# Patient Record
Sex: FEMALE | Race: WHITE | Employment: OTHER | URBAN - METROPOLITAN AREA
[De-identification: names, ages, dates, MRNs, and addresses within clinical notes are randomized per-mention and may not be internally consistent; named-entity substitution may affect disease eponyms.]

---

## 2017-02-03 ENCOUNTER — OFFICE VISIT (OUTPATIENT)
Dept: FAMILY MEDICINE CLINIC | Age: 62
End: 2017-02-03

## 2017-02-03 ENCOUNTER — HOSPITAL ENCOUNTER (OUTPATIENT)
Dept: LAB | Age: 62
Discharge: HOME | End: 2017-02-03
Payer: MEDICARE

## 2017-02-03 VITALS
RESPIRATION RATE: 16 BRPM | BODY MASS INDEX: 34.02 KG/M2 | WEIGHT: 192 LBS | HEIGHT: 63 IN | SYSTOLIC BLOOD PRESSURE: 128 MMHG | TEMPERATURE: 98.3 F | OXYGEN SATURATION: 96 % | HEART RATE: 119 BPM | DIASTOLIC BLOOD PRESSURE: 79 MMHG

## 2017-02-03 DIAGNOSIS — N30.00 ACUTE CYSTITIS WITHOUT HEMATURIA: Primary | ICD-10-CM

## 2017-02-03 DIAGNOSIS — R30.0 DYSURIA: ICD-10-CM

## 2017-02-03 DIAGNOSIS — N30.00 ACUTE CYSTITIS WITHOUT HEMATURIA: ICD-10-CM

## 2017-02-03 LAB
BILIRUB UR QL STRIP: NEGATIVE
GLUCOSE UR-MCNC: NEGATIVE MG/DL
KETONES P FAST UR STRIP-MCNC: NEGATIVE MG/DL
PH UR STRIP: 7 [PH] (ref 4.6–8)
PROT UR QL STRIP: NEGATIVE MG/DL
SP GR UR STRIP: 1.02 (ref 1–1.03)
UA UROBILINOGEN AMB POC: NORMAL (ref 0.2–1)
URINALYSIS CLARITY POC: NORMAL
URINALYSIS COLOR POC: YELLOW
URINE BLOOD POC: NEGATIVE
URINE LEUKOCYTES POC: NORMAL
URINE NITRITES POC: POSITIVE

## 2017-02-03 PROCEDURE — 87186 SC STD MICRODIL/AGAR DIL: CPT | Performed by: FAMILY MEDICINE

## 2017-02-03 PROCEDURE — 87077 CULTURE AEROBIC IDENTIFY: CPT | Performed by: FAMILY MEDICINE

## 2017-02-03 PROCEDURE — 87086 URINE CULTURE/COLONY COUNT: CPT | Performed by: FAMILY MEDICINE

## 2017-02-03 RX ORDER — SULFAMETHOXAZOLE AND TRIMETHOPRIM 800; 160 MG/1; MG/1
1 TABLET ORAL 2 TIMES DAILY
Qty: 10 TAB | Refills: 0 | Status: SHIPPED | OUTPATIENT
Start: 2017-02-03 | End: 2017-02-08

## 2017-02-03 RX ORDER — PHENAZOPYRIDINE HYDROCHLORIDE 200 MG/1
200 TABLET, FILM COATED ORAL
Qty: 9 TAB | Refills: 0 | Status: SHIPPED | OUTPATIENT
Start: 2017-02-03 | End: 2017-09-27

## 2017-02-03 NOTE — PROGRESS NOTES
Rm 1  Pt presents to the clinic complaining of a burning sensation when urinating. Pt believes it to be a bladder infection. Flu shot requested: no    Depression Screening Completed: yes    Learning Assessment Completed: yes    Abuse Screening Completed: yes    Health Maintenance reviewed and discussed per provider: yes    Advance Directive:    1. Do you have an advance directive in place? Patient Reply: no    2. If not, would you like material regarding how to put one in place? Patient Reply: no     Coordination of Care:    1. Have you been to the ER, urgent care clinic since your last visit? Hospitalized since your last visit? no    2. Have you seen or consulted any other health care providers outside of the [address] [place] [address] since your last visit? Include any pap smears or colon screening.  no

## 2017-02-03 NOTE — PROGRESS NOTES
SUBJECTIVE: David Nieves is a 64 y.o. female who complains of urinary frequency, urgency and dysuria x 10 days, without flank pain, fever, chills, or abnormal vaginal discharge or bleeding. OBJECTIVE: Appears well, in no apparent distress. Vital signs are normal, the patient is afebrile. The abdomen is soft without tenderness, guarding, mass, rebound or organomegaly. No CVA tenderness or inguinal adenopathy noted. Urine dipstick shows positive for nitites and positive for leukocytes. Results for orders placed or performed in visit on 02/03/17   AMB POC URINALYSIS DIP STICK AUTO W/O MICRO   Result Value Ref Range    Color (UA POC) Yellow     Clarity (UA POC) Cloudy     Glucose (UA POC) Negative Negative    Bilirubin (UA POC) Negative Negative    Ketones (UA POC) Negative Negative    Specific gravity (UA POC) 1.020 1.001 - 1.035    Blood (UA POC) Negative Negative    pH (UA POC) 7.0 4.6 - 8.0    Protein (UA POC) Negative Negative mg/dL    Urobilinogen (UA POC) 0.2 mg/dL 0.2 - 1    Nitrites (UA POC) Positive Negative    Leukocyte esterase (UA POC) 3+ Negative         ASSESSMENT: UTI uncomplicated without evidence of pyelonephritis    PLAN: Treatment per orders - also push fluids. Call or return to clinic prn if these symptoms worsen or fail to improve as anticipated.

## 2017-02-03 NOTE — MR AVS SNAPSHOT
Visit Information Date & Time Provider Department Dept. Phone Encounter #  
 2/3/2017 12:45 PM Donovan Chicas, [address] [facility] [phone] [id number] Follow-up Instructions Return if symptoms worsen or fail to improve. Your Appointments 2/3/2017 12:45 PM  
Office Visit with Donovan Chicas MD  
[address] [facility]) Appt Note: Possible Bladder Infection Eileen Montes [address] [facility] [address]  
[phone]  
  
   
 [address] [id number] Upcoming Health Maintenance Date Due DTaP/Tdap/Td series (1 - Tdap) 5/3/1976 INFLUENZA AGE 9 TO ADULT 8/1/2016 BREAST CANCER SCRN MAMMOGRAM 11/20/2017 PAP AKA CERVICAL CYTOLOGY 3/2/2018 COLONOSCOPY 12/7/2025 Allergies as of 2/3/2017  Review Complete On: 2/3/2017 By: Donovan Chicas MD  
  
 Severity Noted Reaction Type Reactions Codeine  10/09/2010    Other (comments) Pass out Current Immunizations  Reviewed on 9/9/2014 Name Date Influenza Vaccine 10/10/2013 Influenza Vaccine PF 9/9/2014 10:22 AM  
  
 Not reviewed this visit You Were Diagnosed With   
  
 Codes Comments Acute cystitis without hematuria    -  Primary ICD-10-CM: N30.00 ICD-9-CM: 595.0 Dysuria     ICD-10-CM: R30.0 ICD-9-CM: 206. 1 Vitals BP Pulse Temp Resp Height(growth percentile) Weight(growth percentile) 128/79 (BP 1 Location: Right arm, BP Patient Position: Sitting) (!) 119 98.3 °F (36.8 °C) (Oral) 16 5' 3\" (1.6 m) 192 lb (87.1 kg) SpO2 BMI OB Status Smoking Status 96% 34.01 kg/m2 Hysterectomy Former Smoker Vitals History BMI and BSA Data Body Mass Index Body Surface Area 34.01 kg/m 2 1.97 m 2 Preferred Pharmacy Pharmacy Name Phone [facility] [phone] [phone] Your Updated Medication List  
  
   
 This list is accurate as of: 2/3/17 12:12 PM.  Always use your most recent med list.  
  
  
  
  
 acetaminophen 325 mg tablet Commonly known as:  TYLENOL Take 650 mg by mouth every eight (8) hours as needed for Pain. aspirin delayed-release 81 mg tablet Take 81 mg by mouth daily. calcium carbonate-vitamin D3 600 mg (1,500 mg)-800 unit Mcfarlane Take 1 Tab by mouth daily. CO Q-10 100 mg capsule Generic drug:  co-enzyme Q-10 Take 100 mg by mouth daily. gabapentin 300 mg capsule Commonly known as:  NEURONTIN Take 300 mg by mouth two (2) times a day. GEODON 80 mg capsule Generic drug:  ziprasidone Take 80 mg by mouth two (2) times daily (with meals). lovastatin 40 mg tablet Commonly known as:  MEVACOR  
TAKE 1/2 TABLET BY MOUTH ONCE DAILY AT BEDTIME  
  
 methylphenidate 20 mg tablet Commonly known as:  RITALIN Take 20 mg by mouth daily. multivitamin tablet Commonly known as:  ONE A DAY Take 1 Tab by mouth daily. phenazopyridine 200 mg tablet Commonly known as:  PYRIDIUM Take 1 Tab by mouth three (3) times daily as needed for Pain. SEROquel  mg sr tablet Generic drug:  QUEtiapine SR Take 300 mg by mouth nightly. trimethoprim-sulfamethoxazole 160-800 mg per tablet Commonly known as:  BACTRIM DS, SEPTRA DS Take 1 Tab by mouth two (2) times a day for 5 days. Prescriptions Sent to Pharmacy Refills  
 phenazopyridine (PYRIDIUM) 200 mg tablet 0 Sig: Take 1 Tab by mouth three (3) times daily as needed for Pain. Class: Normal  
 Pharmacy: [address] Ph #: [phone] Route: Oral  
 trimethoprim-sulfamethoxazole (BACTRIM DS, SEPTRA DS) 160-800 mg per tablet 0 Sig: Take 1 Tab by mouth two (2) times a day for 5 days.   
 Class: Normal  
 Pharmacy: [address] [place] [address] [facility] Ph #: [phone] Route: Oral  
  
We Performed the Following AMB POC URINALYSIS DIP STICK AUTO W/O MICRO [04694 CPT(R)] Follow-up Instructions Return if symptoms worsen or fail to improve. Patient Instructions Take the antibiotic for the full 5 days, and use the pyridium for pain as needed. Recheck if you're not better by Monday. [address] [facility] office on [address] is open on Saturdays if you ever need to be seen on a Saturday. Their hours are 8-5. Introducing [facility]! Dear Kemi Ching: Thank you for requesting a Ensequence account. Our records indicate that you have previously registered for a Ensequence account but its currently inactive. Please call our Ensequence support line at [phone]. Additional Information If you have questions, please visit the Frequently Asked Questions section of the Ensequence website at [email or web address] Remember, Ensequence is NOT to be used for urgent needs. For medical emergencies, dial 911. Now available from your iPhone and Android! Please provide this summary of care documentation to your next provider. Your primary care clinician is listed as Uday Pinto. If you have any questions after today's visit, please call [phone].

## 2017-02-03 NOTE — PATIENT INSTRUCTIONS
Take the antibiotic for the full 5 days, and use the pyridium for pain as needed. Recheck if you're not better by Monday. [address] [facility] office on [address] is open on Saturdays if you ever need to be seen on a Saturday. Their hours are 8-5.

## 2017-02-06 LAB
BACTERIA SPEC CULT: NORMAL
BACTERIA SPEC CULT: NORMAL
SERVICE CMNT-IMP: NORMAL

## 2017-04-27 ENCOUNTER — HOSPITAL ENCOUNTER (OUTPATIENT)
Dept: LAB | Age: 62
Discharge: HOME | End: 2017-04-27
Payer: MEDICARE

## 2017-04-27 PROCEDURE — 88175 CYTOPATH C/V AUTO FLUID REDO: CPT | Performed by: OBSTETRICS & GYNECOLOGY

## 2017-05-09 ENCOUNTER — HOSPITAL ENCOUNTER (OUTPATIENT)
Dept: LAB | Age: 62
Discharge: HOME | End: 2017-05-09
Payer: MEDICARE

## 2017-05-09 ENCOUNTER — OFFICE VISIT (OUTPATIENT)
Dept: FAMILY MEDICINE CLINIC | Age: 62
End: 2017-05-09

## 2017-05-09 VITALS
WEIGHT: 208 LBS | HEIGHT: 63 IN | OXYGEN SATURATION: 96 % | RESPIRATION RATE: 16 BRPM | BODY MASS INDEX: 36.86 KG/M2 | DIASTOLIC BLOOD PRESSURE: 88 MMHG | HEART RATE: 83 BPM | TEMPERATURE: 99.1 F | SYSTOLIC BLOOD PRESSURE: 118 MMHG

## 2017-05-09 DIAGNOSIS — E78.5 DYSLIPIDEMIA: ICD-10-CM

## 2017-05-09 DIAGNOSIS — Z00.00 ROUTINE GENERAL MEDICAL EXAMINATION AT A HEALTH CARE FACILITY: Primary | ICD-10-CM

## 2017-05-09 DIAGNOSIS — Z13.39 SCREENING FOR ALCOHOLISM: ICD-10-CM

## 2017-05-09 DIAGNOSIS — Z79.899 HIGH RISK MEDICATION USE: ICD-10-CM

## 2017-05-09 LAB
ALBUMIN SERPL BCP-MCNC: 4.2 G/DL (ref 3.4–5)
ALBUMIN/GLOB SERPL: 1.2 {RATIO} (ref 0.8–1.7)
ALP SERPL-CCNC: 122 U/L (ref 45–117)
ALT SERPL-CCNC: 31 U/L (ref 13–56)
ANION GAP BLD CALC-SCNC: 8 MMOL/L (ref 3–18)
AST SERPL W P-5'-P-CCNC: 22 U/L (ref 15–37)
BILIRUB SERPL-MCNC: 0.4 MG/DL (ref 0.2–1)
BUN SERPL-MCNC: 15 MG/DL (ref 7–18)
BUN/CREAT SERPL: 15 (ref 12–20)
CALCIUM SERPL-MCNC: 10.7 MG/DL (ref 8.5–10.1)
CHLORIDE SERPL-SCNC: 106 MMOL/L (ref 100–108)
CHOLEST SERPL-MCNC: 170 MG/DL
CO2 SERPL-SCNC: 32 MMOL/L (ref 21–32)
CREAT SERPL-MCNC: 1.03 MG/DL (ref 0.6–1.3)
GLOBULIN SER CALC-MCNC: 3.5 G/DL (ref 2–4)
GLUCOSE SERPL-MCNC: 99 MG/DL (ref 74–99)
HDLC SERPL-MCNC: 79 MG/DL (ref 40–60)
HDLC SERPL: 2.2 {RATIO} (ref 0–5)
LDLC SERPL CALC-MCNC: 68.4 MG/DL (ref 0–100)
LIPID PROFILE,FLP: ABNORMAL
POTASSIUM SERPL-SCNC: 5.2 MMOL/L (ref 3.5–5.5)
PROT SERPL-MCNC: 7.7 G/DL (ref 6.4–8.2)
SODIUM SERPL-SCNC: 146 MMOL/L (ref 136–145)
TRIGL SERPL-MCNC: 113 MG/DL (ref ?–150)
VLDLC SERPL CALC-MCNC: 22.6 MG/DL

## 2017-05-09 PROCEDURE — 80061 LIPID PANEL: CPT | Performed by: PHYSICIAN ASSISTANT

## 2017-05-09 PROCEDURE — 80053 COMPREHEN METABOLIC PANEL: CPT | Performed by: PHYSICIAN ASSISTANT

## 2017-05-09 NOTE — PROGRESS NOTES
This is a Subsequent Medicare Annual Wellness Visit providing Personalized Prevention Plan Services (PPPS) (Performed 12 months after initial AWV and PPPS )    I have reviewed the patient's medical history in detail and updated the computerized patient record. History     Past Medical History:   Diagnosis Date    Attention deficit hyperactivity disorder     Bipolar disorder     Depression     nos    Dyslipidemia     Fetal alcohol syndrome       Past Surgical History:   Procedure Laterality Date    HX COLONOSCOPY  12/7/2015    Repeat colonoscopy in 10 yrs per Dr. Lauro Fontaine      2001  varicose vein     Current Outpatient Prescriptions   Medication Sig Dispense Refill    lovastatin (MEVACOR) 40 mg tablet TAKE 1/2 TABLET BY MOUTH ONCE DAILY AT BEDTIME 30 Tab 4    calcium carbonate-vitamin D3 600 mg (1,500 mg)-800 unit chew Take 1 Tab by mouth daily.  gabapentin (NEURONTIN) 300 mg capsule Take 300 mg by mouth two (2) times a day.  QUEtiapine SR (SEROQUEL XR) 300 mg sr tablet Take 200 mg by mouth nightly.  methylphenidate (RITALIN) 20 mg tablet Take 20 mg by mouth daily.  aspirin delayed-release 81 mg tablet Take 81 mg by mouth daily.  multivitamin (ONE A DAY) tablet Take 1 Tab by mouth daily.  ziprasidone (GEODON) 80 mg capsule Take 80 mg by mouth two (2) times daily (with meals).  phenazopyridine (PYRIDIUM) 200 mg tablet Take 1 Tab by mouth three (3) times daily as needed for Pain. 9 Tab 0    co-enzyme Q-10 (CO Q-10) 100 mg capsule Take 100 mg by mouth daily.  acetaminophen (TYLENOL) 325 mg tablet Take 650 mg by mouth every eight (8) hours as needed for Pain.        Allergies   Allergen Reactions    Codeine Other (comments)     Pass out     Family History   Problem Relation Age of Onset    Heart Attack Father     Other Father      Polio left hand, childhood onset    Alcohol abuse Mother    [facility] Bipolar Disorder Mother     Bipolar Disorder Brother     Attention Deficit Disorder Brother     Congenital heart defect Paternal Aunt     Alcohol abuse Brother     Bipolar Disorder Brother     Suicide Brother     Congenital heart defect Son     Attention Deficit Hyperactivity Disorder Son      Social History   Substance Use Topics    Smoking status: Former Smoker     Types: Cigarettes     Start date: 3/17/1968     Quit date: 3/17/1999    Smokeless tobacco: Never Used    Alcohol use 0.5 oz/week     1 Shots of liquor per week      Comment: 1-2 shots of liqueur/month     Patient Active Problem List   Diagnosis Code    Dyslipidemia E78.5       Depression Risk Factor Screening:     PHQ over the last two weeks 5/9/2017   PHQ Not Done -   Little interest or pleasure in doing things Not at all   Feeling down, depressed or hopeless Not at all   Total Score PHQ 2 0     Alcohol Risk Factor Screening: On any occasion during the past 3 months, have you had more than 3 drinks containing alcohol? No    Do you average more than 7 drinks per week? No      Functional Ability and Level of Safety:     Hearing Loss   none    Activities of Daily Living   Self-care. Requires assistance with: no ADLs    Fall Risk     Fall Risk Assessment, last 12 mths 4/7/2014   Able to walk? Yes   Fall in past 12 months? No     Abuse Screen   Patient is not abused    Review of Systems   Review of Systems   Constitutional: Negative for chills, fever and malaise/fatigue. HENT: Negative for congestion and sore throat. Eyes: Negative for blurred vision and double vision. Respiratory: Negative for cough and shortness of breath. Cardiovascular: Negative for chest pain and palpitations. Gastrointestinal: Negative for blood in stool, nausea and vomiting. Genitourinary: Negative for hematuria. Musculoskeletal: Negative for joint pain and myalgias. Skin: Negative for rash.    Neurological: Negative for dizziness, tingling, weakness and headaches. Psychiatric/Behavioral: Positive for depression (under treatment). The patient is not nervous/anxious and does not have insomnia. Physical Examination     Evaluation of Cognitive Function:  Mood/affect:  neutral, happy  Appearance: age appropriate  Family member/caregiver input: none    See E&M note. Patient Care Team:  Lauri Lemons MD as PCP - General (Family Practice)  Arlyn Etienne MD as Physician (Psychiatry)  Vesna Pickard MD as Physician (Obstetrics & Gynecology)  Ramonita Nguyen MD as Physician (Cardiology)  Maria Luisa Hopkins NP (Nurse Practitioner)  Mey Salazar MD (Colon and Rectal Surgery)    Advice/Referrals/Counseling   Education and counseling provided:  Are appropriate based on today's review and evaluation      Assessment/Plan       ICD-10-CM ICD-9-CM    1. Routine general medical examination at a health care facility Z00.00 V70.0    2. Screening for alcoholism Z13.89 V79.1    3. Dyslipidemia E78.5 272.4 LIPID PANEL   4. High risk medication use N07.343 Y63.36 METABOLIC PANEL, COMPREHENSIVE   . Pt up to date on health maintenance, except possibly TDAP, which is not covered by medicare except in the event of skin-penetrating injury. Otherwise, pt is doing well on current regimen. Will follow labs.      Tu Ritter PA-C

## 2017-05-09 NOTE — MR AVS SNAPSHOT
Visit Information Date & Time Provider Department Dept. Phone Encounter #  
 5/9/2017 11:30 AM Steven Baldwin PA-C [facility] [phone] [id number] Upcoming Health Maintenance Date Due DTaP/Tdap/Td series (1 - Tdap) 5/3/1976 INFLUENZA AGE 9 TO ADULT 8/1/2017 BREAST CANCER SCRN MAMMOGRAM 11/20/2017 PAP AKA CERVICAL CYTOLOGY 4/27/2020 COLONOSCOPY 12/7/2025 Allergies as of 5/9/2017  Review Complete On: 5/9/2017 By: Steven Baldwin PA-C Severity Noted Reaction Type Reactions Codeine  10/09/2010    Other (comments) Pass out Current Immunizations  Reviewed on 9/9/2014 Name Date Influenza Vaccine 10/10/2013 Influenza Vaccine PF 9/9/2014 10:22 AM  
  
 Not reviewed this visit You Were Diagnosed With   
  
 Codes Comments Dyslipidemia    -  Primary ICD-10-CM: E78.5 ICD-9-CM: 272.4 Routine general medical examination at a health care facility     ICD-10-CM: Z00.00 ICD-9-CM: V70.0 Screening for alcoholism     ICD-10-CM: Z13.89 ICD-9-CM: V79.1 High risk medication use     ICD-10-CM: Z79.899 ICD-9-CM: V58.69 Vitals BP Pulse Temp Resp Height(growth percentile) Weight(growth percentile) 118/88 (BP 1 Location: Right arm, BP Patient Position: Sitting) 83 99.1 °F (37.3 °C) (Oral) 16 5' 3\" (1.6 m) 208 lb (94.3 kg) SpO2 BMI OB Status Smoking Status 96% 36.85 kg/m2 Hysterectomy Former Smoker BMI and BSA Data Body Mass Index Body Surface Area  
 36.85 kg/m 2 2.05 m 2 Preferred Pharmacy Pharmacy Name Phone [facility] [address] [place] [address]. [address] [phone] Your Updated Medication List  
  
   
This list is accurate as of: 5/9/17 11:56 AM.  Always use your most recent med list.  
  
  
  
  
 acetaminophen 325 mg tablet Commonly known as:  TYLENOL  
 Take 650 mg by mouth every eight (8) hours as needed for Pain. aspirin delayed-release 81 mg tablet Take 81 mg by mouth daily. calcium carbonate-vitamin D3 600 mg (1,500 mg)-800 unit Mcfarlane Take 1 Tab by mouth daily. CO Q-10 100 mg capsule Generic drug:  co-enzyme Q-10 Take 100 mg by mouth daily. gabapentin 300 mg capsule Commonly known as:  NEURONTIN Take 300 mg by mouth two (2) times a day. GEODON 80 mg capsule Generic drug:  ziprasidone Take 80 mg by mouth two (2) times daily (with meals). lovastatin 40 mg tablet Commonly known as:  MEVACOR  
TAKE 1/2 TABLET BY MOUTH ONCE DAILY AT BEDTIME  
  
 methylphenidate 20 mg tablet Commonly known as:  RITALIN Take 20 mg by mouth daily. multivitamin tablet Commonly known as:  ONE A DAY Take 1 Tab by mouth daily. phenazopyridine 200 mg tablet Commonly known as:  PYRIDIUM Take 1 Tab by mouth three (3) times daily as needed for Pain. SEROquel  mg sr tablet Generic drug:  QUEtiapine SR Take 200 mg by mouth nightly. Patient Instructions Studies are showing some important health benefits of vitamin K2 (not to be confused with vitamin K1). It has been shown to improve heart health and bone health, which can be important for conditions like osteoporosis and atherosclerosis. Food sources include natto (fermented soy), liver, egg yolks, meat, and high-fat dairy. There are also supplements available online and in health food stores (as MK-7 and MK-4). I recommend about 200 mcg of MK-7 daily. Here are some good online articles for more information: 
 
http://authoritynutrition. com/vitamin-k2/ 
https://www.Trion Worlds.com/. shtml 
articles. mercola.EasyPost/sites/articles/archive/2015/06/28/vitamin-k2-health-benefits. aspx I also recommend resuming the Co-Q10 supplement.   
 
A healthy gut contains many species of bacteria and yeast that can help with the breakdown of starches, and improve the function of the immune system (probiotics). Good gut margarita also help with the absorption of nutrients such as vitamins A,D,E, and K, as well as calcium, iron, and chromium. Probiotic foods or supplements help to add microorganisms to the stomach and intestines. These foods include fermented foods such as yogurt, sauerkraut, kefir, kimchi, natto, and miso. Prebiotics are foods that help feed the existing microorganisms in the gut. These include bananas, artichokes, leeks, garlic, and onions. http://authorityNormOxys. com/probiotics-101/ 
https://Bellmetric.Red Hawk Interactive/19-best-prebiotic-foods/ 
https://Bellmetric. Red Hawk Interactive/8-health-benefits-of-probiotics/ 
https://Bellmetric.Red Hawk Interactive/best-probiotic-supplement/ 
 
 
 
  
Introducing MYCHART! Dear Kavin Martinez: Thank you for requesting a InfoRemate account. Our records indicate that you have previously registered for a InfoRemate account but its currently inactive. Please call our InfoRemate support line at [phone]. Additional Information If you have questions, please visit the Frequently Asked Questions section of the InfoRemate website at [email or web address] Remember, 8x8 Inct is NOT to be used for urgent needs. For medical emergencies, dial 911. Now available from your iPhone and Android! Please provide this summary of care documentation to your next provider. Your primary care clinician is listed as Uday Pinto. If you have any questions after today's visit, please call [phone].

## 2017-05-09 NOTE — PROGRESS NOTES
HISTORY OF PRESENT ILLNESS  Namita Ojeda is a 58 y.o. female. HPI Comments: Pt presents for her annual wellness visit and for follow-up of her dyslipidemia. States she stopped taking Coenzyme Q10 for no reason other than she didn't renew it. She is seeing a psychiatrist who she feels is managing her bipolar and ADHD well, and is otherwise feeling well today. Complete Physical   Pertinent negatives include no chest pain, no headaches and no shortness of breath. Review of Systems   Constitutional: Negative for chills, fever and malaise/fatigue. HENT: Negative for congestion and sore throat. Eyes: Negative for blurred vision and double vision. Respiratory: Negative for cough and shortness of breath. Cardiovascular: Negative for chest pain and palpitations. Gastrointestinal: Negative for blood in stool, nausea and vomiting. Genitourinary: Negative for hematuria. Musculoskeletal: Negative for joint pain and myalgias. Skin: Negative for rash. Neurological: Negative for dizziness, tingling, weakness and headaches. Psychiatric/Behavioral: Positive for depression (under treatment). The patient is not nervous/anxious and does not have insomnia. Visit Vitals    /88 (BP 1 Location: Right arm, BP Patient Position: Sitting)    Pulse 83    Temp 99.1 °F (37.3 °C) (Oral)    Resp 16    Ht 5' 3\" (1.6 m)    Wt 208 lb (94.3 kg)    SpO2 96%    BMI 36.85 kg/m2       Physical Exam   Constitutional: She is oriented to person, place, and time. Vital signs are normal. She appears well-developed and well-nourished. She is cooperative. She does not appear ill. No distress. HENT:   Head: Normocephalic and atraumatic. Right Ear: External ear normal.   Left Ear: External ear normal.   Nose: Nose normal. No nasal deformity. Mouth/Throat: Oropharynx is clear and moist and mucous membranes are normal.   Eyes: Conjunctivae are normal.   Neck: Neck supple.    Cardiovascular: Normal rate, regular rhythm and normal heart sounds. Exam reveals no gallop and no friction rub. No murmur heard. Pulses:       Radial pulses are 2+ on the right side, and 2+ on the left side. Pulmonary/Chest: Effort normal and breath sounds normal. She has no decreased breath sounds. She has no wheezes. She has no rhonchi. She has no rales. Lymphadenopathy:     She has no cervical adenopathy. Neurological: She is alert and oriented to person, place, and time. Skin: Skin is warm and dry. Psychiatric: She has a normal mood and affect. Her speech is normal and behavior is normal. Thought content normal.   Nursing note and vitals reviewed. ASSESSMENT and PLAN    ICD-10-CM ICD-9-CM    1. Routine general medical examination at a health care facility Z00.00 V70.0    2. Screening for alcoholism Z13.89 V79.1    3. Dyslipidemia E78.5 272.4 LIPID PANEL   4. High risk medication use E06.371 E99.02 METABOLIC PANEL, COMPREHENSIVE     1. Medicare annual wellness visit (). HM is up to date (except Tdap). 2. No risk noted for alcoholism (states she drinks rarely). 3. Recommended pt resume her intake of Co-Q10 d/t depletion effects of her statin medication. She indicated that she would start taking it again. 4. Will follow labs and advise. Also discussed adding a vitamin K-2 supplement to her regimen d/t history of osteopenia (?), and currently supplementing with vitamin D and calcium. Recommended 200 mcg daily & provided list of vitamin k2 foods. Pt verbalized understanding and agreement with the plan of care.     Ana Curran PA-C

## 2017-05-09 NOTE — PATIENT INSTRUCTIONS
Studies are showing some important health benefits of vitamin K2 (not to be confused with vitamin K1). It has been shown to improve heart health and bone health, which can be important for conditions like osteoporosis and atherosclerosis. Food sources include natto (fermented soy), liver, egg yolks, meat, and high-fat dairy. There are also supplements available online and in health food stores (as MK-7 and MK-4). I recommend about 200 mcg of MK-7 daily. Here are some good online articles for more information:    http://Cozmik Body. Privlo/vitamin-k2/  https://www.Good Eggs/. shtml  articles. mercola.Privlo/sites/articles/archive/2015/06/28/vitamin-k2-health-benefits. aspx    I also recommend resuming the Co-Q10 supplement. A healthy gut contains many species of bacteria and yeast that can help with the breakdown of starches, and improve the function of the immune system (probiotics). Good gut margarita also help with the absorption of nutrients such as vitamins A,D,E, and K, as well as calcium, iron, and chromium. Probiotic foods or supplements help to add microorganisms to the stomach and intestines. These foods include fermented foods such as yogurt, sauerkraut, kefir, kimchi, natto, and miso. Prebiotics are foods that help feed the existing microorganisms in the gut. These include bananas, artichokes, leeks, garlic, and onions. http://authoritynutrition. com/probiotics-101/  https://authorityBetaUsersNow.com.com/19-best-prebiotic-foods/  https://authorityBetaUsersNow.com. com/8-health-benefits-of-probiotics/  https://authorityBetaUsersNow.com.com/best-probiotic-supplement/

## 2017-05-09 NOTE — PROGRESS NOTES
Patient presents to the clinic for a complete physical.      Depression Screening Completed: yes    Learning Assessment Completed: yes    Abuse Screening Completed: yes    Health Maintenance reviewed and discussed per provider: yes     Coordination of Care:    1. Have you been to the ER, urgent care clinic since your last visit? Hospitalized since your last visit? no    2. Have you seen or consulted any other health care providers outside of the [address] [place] [address] since your last visit? Include any pap smears or colon screening.  no

## 2017-05-10 ENCOUNTER — TELEPHONE (OUTPATIENT)
Dept: FAMILY MEDICINE CLINIC | Age: 62
End: 2017-05-10

## 2017-05-10 NOTE — PROGRESS NOTES
Lipids look good. Sodium and calcium are slightly elevated. Calcium historically seems to stay in the top of the range and showed mild elevation about 2 years ago. Plan to recheck in a couple of weeks. Alk phos also slightly elevated (not seen previously).

## 2017-05-10 NOTE — TELEPHONE ENCOUNTER
Please let pt know that her calcium came back slightly elevated and ask her to return well-hydrated in a couple of weeks so we can recheck it. Her cholesterol looks good!

## 2017-05-12 NOTE — TELEPHONE ENCOUNTER
Called patient to inform her that her lab work came back and her cholesterol was good, however her calcium was low and that she needs to come back in a couple of weeks to get it rechecked. Patient didn't answer the phone and no option to leave message was available. Will try again later.

## 2017-05-12 NOTE — TELEPHONE ENCOUNTER
Called patient at [phone] to inform her that her lab work came back. Her cholesterol was good, however her calcium was low and she needs to come back in 1-2 months for a recheck. Phone is no longer in service. I will be mailing a letter to her to have her give a call to the clinic to receive her results and update the phone number.

## 2017-05-15 ENCOUNTER — TELEPHONE (OUTPATIENT)
Dept: FAMILY MEDICINE CLINIC | Age: 62
End: 2017-05-15

## 2017-05-15 NOTE — TELEPHONE ENCOUNTER
Called patient back. Patient was informed our PA Marcia Nieves reviewed her lab results and it indicated her calcium level is slightly elevated. Patient was advised JEANNIE Walker recommends rechecking in a few weeks well-hydrated. Patient was also informed her cholesterol looks good. Patient verbalized understanding and had no further questions.

## 2017-05-15 NOTE — TELEPHONE ENCOUNTER
Letter sent to patient to inform her that we are unable to reach her by phone and to call us at [phone].

## 2017-08-09 ENCOUNTER — HOSPITAL ENCOUNTER (OUTPATIENT)
Dept: GENERAL RADIOLOGY | Age: 62
Discharge: HOME | End: 2017-08-09
Attending: OBSTETRICS & GYNECOLOGY

## 2017-08-09 DIAGNOSIS — M81.0 OSTEOPOROSIS: ICD-10-CM

## 2017-08-09 DIAGNOSIS — Z78.0 POST-MENOPAUSAL: ICD-10-CM

## 2017-08-10 ENCOUNTER — HOSPITAL ENCOUNTER (OUTPATIENT)
Dept: GENERAL RADIOLOGY | Age: 62
Discharge: HOME | End: 2017-08-10
Attending: OBSTETRICS & GYNECOLOGY
Payer: MEDICARE

## 2017-08-10 ENCOUNTER — HOSPITAL ENCOUNTER (OUTPATIENT)
Dept: MAMMOGRAPHY | Age: 62
Discharge: HOME | End: 2017-08-10
Attending: OBSTETRICS & GYNECOLOGY
Payer: MEDICARE

## 2017-08-10 DIAGNOSIS — Z12.39 BREAST CANCER SCREENING: ICD-10-CM

## 2017-08-10 PROCEDURE — 77063 BREAST TOMOSYNTHESIS BI: CPT

## 2017-08-10 PROCEDURE — 77080 DXA BONE DENSITY AXIAL: CPT

## 2017-09-27 ENCOUNTER — OFFICE VISIT (OUTPATIENT)
Dept: FAMILY MEDICINE CLINIC | Age: 62
End: 2017-09-27

## 2017-09-27 ENCOUNTER — HOSPITAL ENCOUNTER (OUTPATIENT)
Dept: LAB | Age: 62
Discharge: HOME | End: 2017-09-27
Payer: MEDICARE

## 2017-09-27 VITALS
OXYGEN SATURATION: 95 % | HEIGHT: 63 IN | RESPIRATION RATE: 16 BRPM | BODY MASS INDEX: 36.32 KG/M2 | DIASTOLIC BLOOD PRESSURE: 89 MMHG | SYSTOLIC BLOOD PRESSURE: 126 MMHG | WEIGHT: 205 LBS | HEART RATE: 83 BPM | TEMPERATURE: 98.6 F

## 2017-09-27 DIAGNOSIS — N30.90 CYSTITIS: ICD-10-CM

## 2017-09-27 DIAGNOSIS — N30.90 CYSTITIS: Primary | ICD-10-CM

## 2017-09-27 DIAGNOSIS — R82.90 ABNORMAL URINE ODOR: ICD-10-CM

## 2017-09-27 DIAGNOSIS — E78.5 DYSLIPIDEMIA: ICD-10-CM

## 2017-09-27 DIAGNOSIS — Z23 ENCOUNTER FOR IMMUNIZATION: ICD-10-CM

## 2017-09-27 DIAGNOSIS — Z13.5 SCREENING FOR GLAUCOMA: ICD-10-CM

## 2017-09-27 LAB
BILIRUB UR QL STRIP: NEGATIVE
GLUCOSE UR-MCNC: NEGATIVE MG/DL
KETONES P FAST UR STRIP-MCNC: NEGATIVE MG/DL
PH UR STRIP: 5.5 [PH] (ref 4.6–8)
PROT UR QL STRIP: NEGATIVE MG/DL
SP GR UR STRIP: 1.02 (ref 1–1.03)
UA UROBILINOGEN AMB POC: NORMAL (ref 0.2–1)
URINALYSIS CLARITY POC: NORMAL
URINALYSIS COLOR POC: YELLOW
URINE BLOOD POC: NEGATIVE
URINE LEUKOCYTES POC: NORMAL
URINE NITRITES POC: POSITIVE

## 2017-09-27 PROCEDURE — 87186 SC STD MICRODIL/AGAR DIL: CPT | Performed by: FAMILY MEDICINE

## 2017-09-27 PROCEDURE — 87077 CULTURE AEROBIC IDENTIFY: CPT | Performed by: FAMILY MEDICINE

## 2017-09-27 PROCEDURE — 87086 URINE CULTURE/COLONY COUNT: CPT | Performed by: FAMILY MEDICINE

## 2017-09-27 RX ORDER — SULFAMETHOXAZOLE AND TRIMETHOPRIM 800; 160 MG/1; MG/1
1 TABLET ORAL 2 TIMES DAILY
Qty: 10 TAB | Refills: 0 | Status: SHIPPED | OUTPATIENT
Start: 2017-09-27 | End: 2017-10-02

## 2017-09-27 NOTE — PROGRESS NOTES
HISTORY OF PRESENT ILLNESS  Namita Hernandez is a 58 y.o. female. HPI Comments: Namita is here today c/o urinary frequency and odor. She states she was drinking a lot of tea and quit but still has the symptoms. She denies having frequent UTIs and denies any other symptoms at this time. She would like a referral to an eye doctor as it has been 3 yrs and she does not have one. Cholesterol Problem   Pertinent negatives include no chest pain, no abdominal pain, no headaches and no shortness of breath. Review of Systems   Constitutional: Negative for chills and fever. Eyes: Negative for blurred vision and double vision. Respiratory: Positive for cough. Negative for sputum production and shortness of breath. Cardiovascular: Negative for chest pain and palpitations. Gastrointestinal: Negative for abdominal pain, nausea and vomiting. Genitourinary: Positive for frequency. Negative for dysuria, flank pain, hematuria and urgency. Foul odor   Neurological: Negative for headaches. Physical Exam   Constitutional: Vital signs are normal. She appears well-developed and well-nourished. HENT:   Right Ear: Tympanic membrane and ear canal normal.   Left Ear: Tympanic membrane and ear canal normal.   Nose: Nose normal.   Mouth/Throat: Uvula is midline, oropharynx is clear and moist and mucous membranes are normal.   Eyes: Pupils are equal, round, and reactive to light. Neck: No thyromegaly present. Cardiovascular: Normal rate and regular rhythm. Pulmonary/Chest: Effort normal and breath sounds normal. No respiratory distress. She has no wheezes. She has no rales. Abdominal: She exhibits no distension. There is no tenderness. There is no rebound, no guarding and no CVA tenderness. Lymphadenopathy:     She has no cervical adenopathy. Skin: No rash noted. Nursing note and vitals reviewed.     Results for orders placed or performed in visit on 09/27/17   [facility] POC URINALYSIS DIP STICK AUTO W/O MICRO   Result Value Ref Range    Color (UA POC) Yellow     Clarity (UA POC) Cloudy     Glucose (UA POC) Negative Negative    Bilirubin (UA POC) Negative Negative    Ketones (UA POC) Negative Negative    Specific gravity (UA POC) 1.020 1.001 - 1.035    Blood (UA POC) Negative Negative    pH (UA POC) 5.5 4.6 - 8.0    Protein (UA POC) Negative Negative mg/dL    Urobilinogen (UA POC) 0.2 mg/dL 0.2 - 1    Nitrites (UA POC) Positive Negative    Leukocyte esterase (UA POC) 2+ Negative       ASSESSMENT and PLAN    ICD-10-CM ICD-9-CM    1. Cystitis N30.90 595.9 trimethoprim-sulfamethoxazole (BACTRIM DS, SEPTRA DS) 160-800 mg per tablet      CULTURE, URINE   2. Abnormal urine odor R82.90 791.9 AMB POC URINALYSIS DIP STICK AUTO W/O MICRO   3. Dyslipidemia E78.5 272.4    4. Encounter for immunization Z23 V03.89 INFLUENZA VIRUS VAC QUAD,SPLIT,PRESV FREE SYRINGE IM      ADMIN INFLUENZA VIRUS VAC      REFERRAL TO OPTOMETRY      CANCELED: SD IMMUNIZ ADMIN,1 SINGLE/COMB VAC/TOXOID   5.  Screening for glaucoma Z13.5 V80.1 REFERRAL TO OPTOMETRY       AVS instructions reviewed with patient, pt verbalized understanding

## 2017-09-27 NOTE — PROGRESS NOTES
Pt received influenza vaccine 0.5ml in right deltoid. Tolerated well. No signs or symptoms of distress noted. Most current VIS given and consent signed.

## 2017-09-27 NOTE — PROGRESS NOTES
Patient presents to the clinic for a follow up on her cholesterol. Patient would also like to discuss urinary frequency that began a few months ago. Vinay Rowell

## 2017-09-27 NOTE — PATIENT INSTRUCTIONS
Take the antibiotic for 5 days. Recheck if it doesn't get better in 2-3 days. Recheck December, we can get labs then. I've referred you to an eye doctor for a checkup. Recheck as needed.

## 2017-09-27 NOTE — MR AVS SNAPSHOT
Visit Information Date & Time Provider Department Dept. Phone Encounter #  
 9/27/2017  8:45 AM Mago Clifford MD [facility] [phone] [id number] Upcoming Health Maintenance Date Due DTaP/Tdap/Td series (1 - Tdap) 5/3/1976 BREAST CANCER SCRN MAMMOGRAM 8/10/2019 PAP AKA CERVICAL CYTOLOGY 4/27/2020 COLONOSCOPY 12/7/2025 Allergies as of 9/27/2017  Review Complete On: 9/27/2017 By: Courtney Srinivasan LPN Severity Noted Reaction Type Reactions Codeine  10/09/2010    Other (comments) Pass out Current Immunizations  Reviewed on 9/9/2014 Name Date Influenza Vaccine 10/10/2013 Influenza Vaccine (Quad) PF 9/27/2017 Influenza Vaccine PF 9/9/2014 10:22 AM  
  
 Not reviewed this visit You Were Diagnosed With   
  
 Codes Comments Cystitis    -  Primary ICD-10-CM: N30.90 ICD-9-CM: 595.9 Abnormal urine odor     ICD-10-CM: R82.90 ICD-9-CM: 791.9 Dyslipidemia     ICD-10-CM: E78.5 ICD-9-CM: 272.4 Encounter for immunization     ICD-10-CM: C62 ICD-9-CM: V03.89 Screening for glaucoma     ICD-10-CM: Z13.5 ICD-9-CM: V80.1 Vitals BP Pulse Temp Resp Height(growth percentile) Weight(growth percentile) 126/89 (BP 1 Location: Left arm, BP Patient Position: Sitting) 83 98.6 °F (37 °C) (Oral) 16 5' 3\" (1.6 m) 205 lb (93 kg) SpO2 BMI OB Status Smoking Status 95% 36.31 kg/m2 Hysterectomy Former Smoker BMI and BSA Data Body Mass Index Body Surface Area  
 36.31 kg/m 2 2.03 m 2 Preferred Pharmacy Pharmacy Name Phone [facility] [address] [place], [place] [address] [phone] Your Updated Medication List  
  
   
This list is accurate as of: 9/27/17  9:31 AM.  Always use your most recent med list.  
  
  
  
  
 acetaminophen 325 mg tablet Commonly known as:  TYLENOL  
 Take 650 mg by mouth every eight (8) hours as needed for Pain. aspirin delayed-release 81 mg tablet Take 81 mg by mouth daily. calcium carbonate-vitamin D3 600 mg (1,500 mg)-800 unit Mcfarlane Take 1 Tab by mouth daily. CO Q-10 100 mg capsule Generic drug:  co-enzyme Q-10 Take 100 mg by mouth daily. gabapentin 300 mg capsule Commonly known as:  NEURONTIN Take 300 mg by mouth two (2) times a day. GEODON 80 mg capsule Generic drug:  ziprasidone Take 80 mg by mouth two (2) times daily (with meals). lovastatin 40 mg tablet Commonly known as:  MEVACOR  
TAKE 1/2 TABLET BY MOUTH ONCE DAILY AT BEDTIME  
  
 methylphenidate HCl 20 mg tablet Commonly known as:  RITALIN Take 20 mg by mouth daily. multivitamin tablet Commonly known as:  ONE A DAY Take 1 Tab by mouth daily. SEROquel  mg sr tablet Generic drug:  QUEtiapine SR Take 100 mg by mouth nightly. trimethoprim-sulfamethoxazole 160-800 mg per tablet Commonly known as:  BACTRIM DS, SEPTRA DS Take 1 Tab by mouth two (2) times a day for 5 days. Prescriptions Sent to Pharmacy Refills  
 trimethoprim-sulfamethoxazole (BACTRIM DS, SEPTRA DS) 160-800 mg per tablet 0 Sig: Take 1 Tab by mouth two (2) times a day for 5 days. Class: Normal  
 Pharmacy: [facility] [address] [place] [address] [address]  #: [phone] Route: Oral  
  
We Performed the Following ADMIN INFLUENZA VIRUS VAC [ [facility]] AMB POC URINALYSIS DIP STICK AUTO W/O MICRO [13565 CPT(R)] INFLUENZA VIRUS VAC QUAD,SPLIT,PRESV FREE SYRINGE IM [id number] CPT(R)] REFERRAL TO OPTOMETRY [id number] Custom] Referral Information Referral ID Referred By Referred To  
  
 [id number] MD [address]   
   [address] [place] Phone: [phone] Fax: [phone] Visits Status Start Date End Date 1 New Request 9/27/17 9/27/18 If your referral has a status of pending review or denied, additional information will be sent to support the outcome of this decision. Patient Instructions Take the antibiotic for 5 days. Recheck if it doesn't get better in 2-3 days. Recheck December, we can get labs then. I've referred you to an eye doctor for a checkup. Recheck as needed. Introducing [facility] HE[facility]! [facility] introduces [employer] patient portal. Now you can access parts of your medical record, email your doctor's office, and request medication refills online. 1. In your internet browser, go to [email or web address] 2. Click on the First Time User? Click Here link in the Sign In box. You will see the New Member Sign Up page. 3. Enter your [employer] Access Code exactly as it appears below. You will not need to use this code after youve completed the sign-up process. If you do not sign up before the expiration date, you must request a new code. · [employer] Access Code: [email or web address] Expires: 12/26/2017  9:31 AM 
 
4. Enter the last four digits of your Social Security Number (xxxx) and Date of Birth (mm/dd/yyyy) as indicated and click Submit. You will be taken to the next sign-up page. 5. Create a [employer] ID. This will be your [employer] login ID and cannot be changed, so think of one that is secure and easy to remember. 6. Create a [employer] password. You can change your password at any time. 7. Enter your Password Reset Question and Answer. This can be used at a later time if you forget your password. 8. Enter your e-mail address. You will receive e-mail notification when new information is available in [address] 9. Click Sign Up. You can now view and download portions of your medical record. 10. Click the Download Summary menu link to download a portable copy of your medical information. If you have questions, please visit the Frequently Asked Questions section of the [email or web address] website. Remember, [employer] is NOT to be used for urgent needs. For medical emergencies, dial 911. Now available from your iPhone and Android! Please provide this summary of care documentation to your next provider. Your primary care clinician is listed as Uday Pinto. If you have any questions after today's visit, please call [phone].

## 2017-09-29 LAB
BACTERIA SPEC CULT: ABNORMAL
SERVICE CMNT-IMP: ABNORMAL

## 2017-09-29 NOTE — PROGRESS NOTES
Urine culture shows growth of E coli that is susceptible to the nitrofurantoin she was given. Please ask her how she's feeling, and if her urine has cleared up.

## 2017-10-15 RX ORDER — LOVASTATIN 40 MG/1
TABLET ORAL
Qty: 30 TAB | Refills: 0 | Status: SHIPPED | OUTPATIENT
Start: 2017-10-15 | End: 2017-12-18 | Stop reason: SDUPTHER

## 2017-11-06 ENCOUNTER — OFFICE VISIT (OUTPATIENT)
Dept: CARDIOLOGY CLINIC | Age: 62
End: 2017-11-06

## 2017-11-06 VITALS
WEIGHT: 209 LBS | DIASTOLIC BLOOD PRESSURE: 75 MMHG | BODY MASS INDEX: 37.03 KG/M2 | OXYGEN SATURATION: 97 % | HEIGHT: 63 IN | HEART RATE: 74 BPM | SYSTOLIC BLOOD PRESSURE: 115 MMHG

## 2017-11-06 DIAGNOSIS — Z82.49 FAMILY HISTORY OF PREMATURE CAD: ICD-10-CM

## 2017-11-06 DIAGNOSIS — E78.5 DYSLIPIDEMIA: Primary | ICD-10-CM

## 2017-11-06 NOTE — MR AVS SNAPSHOT
Visit Information Date & Time Provider Department Dept. Phone Encounter #  
 11/6/2017 10:15 AM Jos Clifford MD [address] [place] [address] Specialist at [facility] [phone] [id number] Follow-up Instructions Return in about 1 year (around 11/6/2018). Upcoming Health Maintenance Date Due DTaP/Tdap/Td series (1 - Tdap) 5/3/1976 BREAST CANCER SCRN MAMMOGRAM 8/10/2019 PAP AKA CERVICAL CYTOLOGY 4/27/2020 COLONOSCOPY 12/7/2025 Allergies as of 11/6/2017  Review Complete On: 9/27/2017 By: Lynn Arnold MD  
  
 Severity Noted Reaction Type Reactions Codeine  10/09/2010    Other (comments) Pass out Current Immunizations  Reviewed on 9/9/2014 Name Date Influenza Vaccine 10/10/2013 Influenza Vaccine (Quad) PF 9/27/2017 Influenza Vaccine PF 9/9/2014 10:22 AM  
  
 Not reviewed this visit Vitals BP Pulse Height(growth percentile) Weight(growth percentile) SpO2 BMI  
 115/75 74 5' 3\" (1.6 m) 209 lb (94.8 kg) 97% 37.02 kg/m2 OB Status Smoking Status Hysterectomy Former Smoker Vitals History BMI and BSA Data Body Mass Index Body Surface Area 37.02 kg/m 2 2.05 m 2 Preferred Pharmacy Pharmacy Name Phone [facility] [address] [place] [address]. [address] [phone] Your Updated Medication List  
  
   
This list is accurate as of: 11/6/17 10:40 AM.  Always use your most recent med list.  
  
  
  
  
 acetaminophen 325 mg tablet Commonly known as:  TYLENOL Take 650 mg by mouth every eight (8) hours as needed for Pain. aspirin delayed-release 81 mg tablet Take 81 mg by mouth daily. calcium carbonate-vitamin D3 600 mg (1,500 mg)-800 unit Mcfarlane Take 1 Tab by mouth daily. CO Q-10 100 mg capsule Generic drug:  co-enzyme Q-10 Take 100 mg by mouth daily. gabapentin 300 mg capsule Commonly known as:  NEURONTIN  
 Take 300 mg by mouth two (2) times a day. GEODON 80 mg capsule Generic drug:  ziprasidone Take 80 mg by mouth two (2) times daily (with meals). lovastatin 40 mg tablet Commonly known as:  MEVACOR  
TAKE 1/2 TABLET BY MOUTH ONCE DAILY AT BEDTIME  
  
 methylphenidate HCl 20 mg tablet Commonly known as:  RITALIN Take 20 mg by mouth daily. multivitamin tablet Commonly known as:  ONE A DAY Take 1 Tab by mouth daily. SEROquel  mg sr tablet Generic drug:  QUEtiapine SR Take 100 mg by mouth nightly. Follow-up Instructions Return in about 1 year (around 11/6/2018). Introducing [facility]! Ashley Aguirre introduces [employer] patient portal. Now you can access parts of your medical record, email your doctor's office, and request medication refills online. 1. In your internet browser, go to [email or web address] 2. Click on the First Time User? Click Here link in the Sign In box. You will see the New Member Sign Up page. 3. Enter your [employer] Access Code exactly as it appears below. You will not need to use this code after youve completed the sign-up process. If you do not sign up before the expiration date, you must request a new code. · [employer] Access Code: [email or web address] Expires: 12/26/2017  8:31 AM 
 
4. Enter the last four digits of your Social Security Number (xxxx) and Date of Birth (mm/dd/yyyy) as indicated and click Submit. You will be taken to the next sign-up page. 5. Create a [employer] ID. This will be your [employer] login ID and cannot be changed, so think of one that is secure and easy to remember. 6. Create a [employer] password. You can change your password at any time. 7. Enter your Password Reset Question and Answer. This can be used at a later time if you forget your password. 8. Enter your e-mail address. You will receive e-mail notification when new information is available in [address] 9. Click Sign Up. You can now view and download portions of your medical record. 10. Click the Download Summary menu link to download a portable copy of your medical information. If you have questions, please visit the Frequently Asked Questions section of the [email or web address] website. Remember, [email or web address] is NOT to be used for urgent needs. For medical emergencies, dial 911. Now available from your iPhone and Android! Please provide this summary of care documentation to your next provider. Your primary care clinician is listed as Uday Pinto. If you have any questions after today's visit, please call [phone].

## 2017-11-12 PROBLEM — Z82.49 FAMILY HISTORY OF PREMATURE CAD: Status: ACTIVE | Noted: 2017-11-12

## 2017-11-12 NOTE — PROGRESS NOTES
Subjective:      Stuart Garvey is in the office today for cardiac reevaluation. She is a 55-year-old woman with a history of dyslipidemia and a family history of premature coronary disease. She was last seen in this office in 2016. At that time, she was doing well without any particular complaints. She has been on lovastatin for her elevated cholesterol in the past.  Her last profile done in May of this year is as listed below. In the office today, she says that she has had no chest pain. She has had no palpitations. She has had no limiting dyspnea. She has her blood pressure checked outside of the office and it has been excellent. At this point, she is trying to get back into walking. She will see Dr. Eddie Tang in December and will have an additional profile done at that time. Patient Active Problem List    Diagnosis Date Noted    Family history of premature CAD 11/12/2017    Dyslipidemia      Current Outpatient Prescriptions   Medication Sig Dispense Refill    lovastatin (MEVACOR) 40 mg tablet TAKE 1/2 TABLET BY MOUTH ONCE DAILY AT BEDTIME 30 Tab 0    co-enzyme Q-10 (CO Q-10) 100 mg capsule Take 100 mg by mouth daily.  calcium carbonate-vitamin D3 600 mg (1,500 mg)-800 unit chew Take 1 Tab by mouth daily.  gabapentin (NEURONTIN) 300 mg capsule Take 300 mg by mouth two (2) times a day.  QUEtiapine SR (SEROQUEL XR) 300 mg sr tablet Take 100 mg by mouth nightly.  methylphenidate (RITALIN) 20 mg tablet Take 20 mg by mouth daily.  acetaminophen (TYLENOL) 325 mg tablet Take 650 mg by mouth every eight (8) hours as needed for Pain.  aspirin delayed-release 81 mg tablet Take 81 mg by mouth daily.  multivitamin (ONE A DAY) tablet Take 1 Tab by mouth daily.  ziprasidone (GEODON) 80 mg capsule Take 80 mg by mouth two (2) times daily (with meals).        Allergies   Allergen Reactions    Codeine Other (comments)     Pass out Past Medical History:   Diagnosis Date    Attention deficit hyperactivity disorder     Bipolar disorder     Depression     nos    Dyslipidemia     Fetal alcohol syndrome      Past Surgical History:   Procedure Laterality Date    HX COLONOSCOPY  12/7/2015    Repeat colonoscopy in 10 yrs per Dr. Jhon Thakur      2001  varicose vein     Family History   Problem Relation Age of Onset    Heart Attack Father     Other Father      Polio left hand, childhood onset    Alcohol abuse Mother     Bipolar Disorder Mother     Bipolar Disorder Brother     Attention Deficit Disorder Brother     Congenital heart defect Paternal Aunt     Alcohol abuse Brother     Bipolar Disorder Brother     Suicide Brother     Congenital heart defect Son     Attention Deficit Hyperactivity Disorder Son      History   Smoking Status    Former Smoker    Types: Cigarettes    Start date: 3/17/1968   Scooter Cords Quit date: 3/17/1999   Smokeless Tobacco    Never Used          Review of Systems, additional:  Constitutional: negative  Eyes: negative  Respiratory: negative  Cardiovascular: negative  Gastrointestinal: negative  Musculoskeletal:negative  Neurological: negative  Behvioral/Psych: negative  Endocrine: negative  ENT: negative    Objective:     Visit Vitals    /75    Pulse 74    Ht 5' 3\" (1.6 m)    Wt 209 lb (94.8 kg)    SpO2 97%    BMI 37.02 kg/m2     General:  alert, cooperative, no distress   Chest Wall: inspection normal - no chest wall deformities or tenderness, respiratory effort normal   Lung: clear to auscultation bilaterally   Heart:  normal rate and regular rhythm, S1 and S2 normal, no murmurs noted, no gallops noted   Abdomen: soft, non-tender.  Bowel sounds normal. No masses,  no organomegaly   Extremities: extremities normal, atraumatic, no cyanosis or edema Skin: no rashes   Neuro: alert, oriented, normal speech, no focal findings or movement disorder noted         Assessment/Plan:       ICD-10-CM ICD-9-CM    1. Dyslipidemia, Lipid profile done 05/2017; CHOL 170, HDL 79, LDL 68, . Continue lovastatin. RT 1 year. E78.5 272.4    2.  Family history of premature CAD Z82.49 V17.3

## 2017-12-18 RX ORDER — LOVASTATIN 40 MG/1
TABLET ORAL
Qty: 30 TAB | Refills: 6 | Status: SHIPPED | OUTPATIENT
Start: 2017-12-18 | End: 2018-02-16 | Stop reason: SDUPTHER

## 2018-01-26 ENCOUNTER — LAB ONLY (OUTPATIENT)
Dept: FAMILY MEDICINE CLINIC | Age: 63
End: 2018-01-26

## 2018-01-26 ENCOUNTER — HOSPITAL ENCOUNTER (OUTPATIENT)
Dept: LAB | Age: 63
Discharge: HOME | End: 2018-01-26
Payer: MEDICARE

## 2018-01-26 ENCOUNTER — OFFICE VISIT (OUTPATIENT)
Dept: FAMILY MEDICINE CLINIC | Age: 63
End: 2018-01-26

## 2018-01-26 VITALS
DIASTOLIC BLOOD PRESSURE: 90 MMHG | SYSTOLIC BLOOD PRESSURE: 117 MMHG | BODY MASS INDEX: 37.92 KG/M2 | HEIGHT: 63 IN | WEIGHT: 214 LBS | OXYGEN SATURATION: 96 % | RESPIRATION RATE: 16 BRPM | TEMPERATURE: 97.1 F | HEART RATE: 94 BPM

## 2018-01-26 DIAGNOSIS — E83.52 HYPERCALCEMIA: ICD-10-CM

## 2018-01-26 DIAGNOSIS — E78.5 DYSLIPIDEMIA: ICD-10-CM

## 2018-01-26 DIAGNOSIS — E78.5 DYSLIPIDEMIA: Primary | ICD-10-CM

## 2018-01-26 DIAGNOSIS — Z79.899 HIGH RISK MEDICATION USE: ICD-10-CM

## 2018-01-26 LAB
ALBUMIN SERPL-MCNC: 4.1 G/DL (ref 3.4–5)
ALBUMIN/GLOB SERPL: 1 {RATIO} (ref 0.8–1.7)
ALP SERPL-CCNC: 128 U/L (ref 45–117)
ALT SERPL-CCNC: 24 U/L (ref 13–56)
ANION GAP SERPL CALC-SCNC: 8 MMOL/L (ref 3–18)
AST SERPL-CCNC: 22 U/L (ref 15–37)
BILIRUB SERPL-MCNC: 0.4 MG/DL (ref 0.2–1)
BUN SERPL-MCNC: 17 MG/DL (ref 7–18)
BUN/CREAT SERPL: 15 (ref 12–20)
CALCIUM SERPL-MCNC: 10.1 MG/DL (ref 8.5–10.1)
CHLORIDE SERPL-SCNC: 107 MMOL/L (ref 100–108)
CHOLEST SERPL-MCNC: 161 MG/DL
CO2 SERPL-SCNC: 32 MMOL/L (ref 21–32)
CREAT SERPL-MCNC: 1.1 MG/DL (ref 0.6–1.3)
GLOBULIN SER CALC-MCNC: 4.1 G/DL (ref 2–4)
GLUCOSE SERPL-MCNC: 109 MG/DL (ref 74–99)
HDLC SERPL-MCNC: 67 MG/DL (ref 40–60)
HDLC SERPL: 2.4 {RATIO} (ref 0–5)
LDLC SERPL CALC-MCNC: 69 MG/DL (ref 0–100)
LIPID PROFILE,FLP: ABNORMAL
POTASSIUM SERPL-SCNC: 5.2 MMOL/L (ref 3.5–5.5)
PROT SERPL-MCNC: 8.2 G/DL (ref 6.4–8.2)
SODIUM SERPL-SCNC: 147 MMOL/L (ref 136–145)
TRIGL SERPL-MCNC: 125 MG/DL (ref ?–150)
VLDLC SERPL CALC-MCNC: 25 MG/DL

## 2018-01-26 PROCEDURE — 80053 COMPREHEN METABOLIC PANEL: CPT | Performed by: FAMILY MEDICINE

## 2018-01-26 PROCEDURE — 82330 ASSAY OF CALCIUM: CPT | Performed by: FAMILY MEDICINE

## 2018-01-26 PROCEDURE — 80061 LIPID PANEL: CPT | Performed by: FAMILY MEDICINE

## 2018-01-26 NOTE — PROGRESS NOTES
HISTORY OF PRESENT ILLNESS  Namita Bosch is a 58 y.o. female. HPI Comments: Ms. Abhinav Jeffrey is here for labs. She has a history of high lipids and has been on Mevacor for years. Her calcium was also elevated last time (10.7). She is getting over a respiratory infection, says she's finally feeling better after 2-3 weeks. She has no complaints today    Cholesterol Problem   Pertinent negatives include no chest pain, no abdominal pain, no headaches and no shortness of breath. Review of Systems   Constitutional: Negative for chills and fever. Eyes: Negative for blurred vision and double vision. Respiratory: Positive for cough (minimal). Negative for sputum production and shortness of breath. Cardiovascular: Negative for chest pain and palpitations. Gastrointestinal: Negative for abdominal pain, nausea and vomiting. Neurological: Negative for headaches. Physical Exam   Neck: No thyromegaly present. Pulmonary/Chest: No respiratory distress. She has no wheezes. She has no rales. Abdominal: She exhibits no distension. There is no tenderness. Lymphadenopathy:     She has no cervical adenopathy. Psychiatric: She has a normal mood and affect. Her behavior is normal.       ASSESSMENT and PLAN    ICD-10-CM ICD-9-CM    1. Dyslipidemia E78.5 272.4 LIPID PANEL   2. Hypercalcemia N67.99 726.89 METABOLIC PANEL, COMPREHENSIVE      CALCIUM, IONIZED   3.  High risk medication use R90.747 C03.89 METABOLIC PANEL, COMPREHENSIVE       AVS instructions reviewed with patient, pt verbalized understanding

## 2018-01-26 NOTE — MR AVS SNAPSHOT
[place] Jose Luis Montes [address] [address] 
[phone] Patient: Mesfin Suazo MRN: [id number] WIQ:0/3/2121 Visit Information Date & Time Provider Department Dept. Phone Encounter #  
 1/26/2018 10:30 AM Bijan Morales  [facility] [phone] [id number] Follow-up Instructions Return if symptoms worsen or fail to improve. Your Appointments 1/26/2018 10:30 AM  
Office Visit with Bijan Morales MD  
[address]) Appt Note: Follow-up on cholesterol and labs [place] [address]  
[phone]  
  
   
 [facility]  
  
    
 11/5/2018 10:15 AM  
Follow Up with Mortimer Plana, MD  
Cardio Specialist at [address]) Appt Note: 1 yr f/u  
 [facility] [address] [address] [address] Upcoming Health Maintenance Date Due DTaP/Tdap/Td series (1 - Tdap) 5/3/1976 BREAST CANCER SCRN MAMMOGRAM 8/10/2019 PAP AKA CERVICAL CYTOLOGY 4/27/2020 COLONOSCOPY 12/7/2025 Allergies as of 1/26/2018  Review Complete On: 1/26/2018 By: Bijan Morales MD  
  
 Severity Noted Reaction Type Reactions Codeine  10/09/2010    Other (comments) Pass out Current Immunizations  Reviewed on 9/9/2014 Name Date Influenza Vaccine 10/10/2013 Influenza Vaccine (Quad) PF 9/27/2017 Influenza Vaccine PF 9/9/2014 10:22 AM  
  
 Not reviewed this visit You Were Diagnosed With   
  
 Codes Comments Dyslipidemia    -  Primary ICD-10-CM: E78.5 ICD-9-CM: 272.4 High risk medication use     ICD-10-CM: Z79.899 ICD-9-CM: V58.69 Hypercalcemia     ICD-10-CM: K61.26 
ICD-9-CM: 275.42 Vitals BP Pulse Temp Resp Height(growth percentile) Weight(growth percentile) 117/90 (BP 1 Location: Right arm, BP Patient Position: Sitting) 94 97.1 °F (36.2 °C) (Oral) 16 5' 3\" (1.6 m) 214 lb (97.1 kg) SpO2 BMI OB Status Smoking Status 96% 37.91 kg/m2 Hysterectomy Former Smoker Vitals History BMI and BSA Data Body Mass Index Body Surface Area  
 37.91 kg/m 2 2.08 m 2 Preferred Pharmacy Pharmacy Name Phone [facility] [address] [place], [place] [address]Maddison [address] [phone] Your Updated Medication List  
  
   
This list is accurate as of: 1/26/18 10:20 AM.  Always use your most recent med list.  
  
  
  
  
 acetaminophen 325 mg tablet Commonly known as:  TYLENOL Take 650 mg by mouth every eight (8) hours as needed for Pain. aspirin delayed-release 81 mg tablet Take 81 mg by mouth daily. calcium carbonate-vitamin D3 600 mg (1,500 mg)-800 unit [place] Take 1 Tab by mouth daily. CO Q-10 100 mg capsule Generic drug:  co-enzyme Q-10 Take 100 mg by mouth daily. gabapentin 300 mg capsule Commonly known as:  NEURONTIN Take 300 mg by mouth two (2) times a day. GEODON 80 mg capsule Generic drug:  ziprasidone Take 80 mg by mouth two (2) times daily (with meals). lovastatin 40 mg tablet Commonly known as:  MEVACOR  
TAKE 1/2 TABLET BY MOUTH ONCE DAILY AT BEDTIME  
  
 methylphenidate HCl 20 mg tablet Commonly known as:  RITALIN Take 20 mg by mouth daily. multivitamin tablet Commonly known as:  ONE A DAY Take 1 Tab by mouth daily. SEROquel  mg sr tablet Generic drug:  QUEtiapine SR Take 100 mg by mouth nightly. Follow-up Instructions Return if symptoms worsen or fail to improve. To-Do List   
 01/26/2018 Lab:  CALCIUM, IONIZED   
  
 01/26/2018 Lab:  LIPID PANEL   
  
 01/26/2018 Lab:  METABOLIC PANEL, COMPREHENSIVE Patient Instructions We will contact you on Monday with the lab results. Recheck 6 months Introducing [facility] & HEALTH SERVICES! [facility] introduces [employer] patient portal. Now you can access parts of your medical record, email your doctor's office, and request medication refills online. 1. In your internet browser, go to [email or web address] 2. Click on the First Time User? Click Here link in the Sign In box. You will see the New Member Sign Up page. 3. Enter your Pay[employer]irma Access Code exactly as it appears below. You will not need to use this code after youve completed the sign-up process. If you do not sign up before the expiration date, you must request a new code. · Pay[employer] Access Code: [email or web address] Expires: 4/26/2018 10:20 AM 
 
4. Enter the last four digits of your Social Security Number (xxxx) and Date of Birth (mm/dd/yyyy) as indicated and click Submit. You will be taken to the next sign-up page. 5. Create a Payfirma ID. This will be your Payfirma login ID and cannot be changed, so think of one that is secure and easy to remember. 6. Create a Pay[employer]irma password. You can change your password at any time. 7. Enter your Password Reset Question and Answer. This can be used at a later time if you forget your password. 8. Enter your e-mail address. You will receive e-mail notification when new information is available in [address] 9. Click Sign Up. You can now view and download portions of your medical record. 10. Click the Download Summary menu link to download a portable copy of your medical information. If you have questions, please visit the Frequently Asked Questions section of the [employer]ir[employer] website. Remember, Payfirma is NOT to be used for urgent needs. For medical emergencies, dial 911. Now available from your iPhone and Android! Please provide this summary of care documentation to your next provider. Your primary care clinician is listed as Uday Pinto. If you have any questions after today's visit, please call [phone].

## 2018-01-26 NOTE — PROGRESS NOTES
Rm 1  Pt presents to the clinic for a follow-up regarding her cholesterol. Flu Shot Requested: no    Depression Screening:  PHQ over the last two weeks 1/26/2018 9/27/2017 5/9/2017 4/7/2014 3/17/2014 3/11/2014   PHQ Not Done - - - - - Active Diagnosis of Depression or Bipolar Disorder   Little interest or pleasure in doing things Not at all Not at all Not at all Not at all Not at all -   Feeling down, depressed or hopeless Not at all Not at all Not at all Not at all Not at all -   Total Score PHQ 2 0 0 0 0 0 -       Learning Assessment:  Learning Assessment 5/9/2017 2/3/2017 4/7/2014 3/11/2014   PRIMARY LEARNER Patient Patient Patient Patient   HIGHEST LEVEL OF EDUCATION - PRIMARY LEARNER  GRADUATED HIGH SCHOOL OR GED GRADUATED HIGH SCHOOL OR GED - GRADUATED HIGH SCHOOL OR GED   BARRIERS PRIMARY LEARNER NONE NONE - NONE   CO-LEARNER CAREGIVER No No - No   PRIMARY LANGUAGE ENGLISH ENGLISH ENGLISH ENGLISH   LEARNER PREFERENCE PRIMARY DEMONSTRATION DEMONSTRATION DEMONSTRATION LISTENING     - - - DEMONSTRATION   ANSWERED BY patient Patient - Patient   RELATIONSHIP SELF SELF - SELF       Abuse Screening:  Abuse Screening Questionnaire 9/27/2017 5/9/2017 2/3/2017   Do you ever feel afraid of your partner? N N N   Are you in a relationship with someone who physically or mentally threatens you? N N N   Is it safe for you to go home? Theo Gabriel       Health Maintenance reviewed and discussed per provider: yes     Coordination of Care:    1. Have you been to the ER, urgent care clinic since your last visit? Hospitalized since your last visit? no    2. Have you seen or consulted any other health care providers outside of the [address] [place] [address] since your last visit? Include any pap smears or colon screening.  no

## 2018-01-28 LAB — CA-I SERPL ISE-MCNC: 5.3 MG/DL (ref 4.5–5.6)

## 2018-01-29 NOTE — PROGRESS NOTES
Advise pt that her blood sugar was slightly elevated, unless she wasn't fasting. We'll keep an eye on it in the future. The rest of her labs are fine.

## 2018-01-31 NOTE — PROGRESS NOTES
Call made to pt, pt was advised of result note, pt verbalized understanding and had no further questions

## 2018-02-16 RX ORDER — LOVASTATIN 40 MG/1
TABLET ORAL
Qty: 30 TAB | Refills: 6 | Status: SHIPPED | OUTPATIENT
Start: 2018-02-16 | End: 2018-02-22 | Stop reason: SDUPTHER

## 2018-02-22 RX ORDER — LOVASTATIN 40 MG/1
TABLET ORAL
Qty: 30 TAB | Refills: 6 | Status: SHIPPED | OUTPATIENT
Start: 2018-02-22 | End: 2018-04-20 | Stop reason: SDUPTHER

## 2018-04-20 RX ORDER — LOVASTATIN 40 MG/1
TABLET ORAL
Qty: 30 TAB | Refills: 6 | Status: SHIPPED | OUTPATIENT
Start: 2018-04-20 | End: 2018-04-23 | Stop reason: SDUPTHER

## 2018-04-23 RX ORDER — LOVASTATIN 40 MG/1
TABLET ORAL
Qty: 90 TAB | Refills: 3 | Status: SHIPPED | OUTPATIENT
Start: 2018-04-23 | End: 2019-06-01 | Stop reason: SDUPTHER

## 2018-06-07 ENCOUNTER — OFFICE VISIT (OUTPATIENT)
Dept: FAMILY MEDICINE CLINIC | Age: 63
End: 2018-06-07

## 2018-06-07 ENCOUNTER — HOSPITAL ENCOUNTER (OUTPATIENT)
Dept: LAB | Age: 63
Discharge: HOME | End: 2018-06-07
Payer: MEDICARE

## 2018-06-07 VITALS
DIASTOLIC BLOOD PRESSURE: 76 MMHG | RESPIRATION RATE: 18 BRPM | TEMPERATURE: 97.3 F | BODY MASS INDEX: 38.27 KG/M2 | HEIGHT: 63 IN | HEART RATE: 80 BPM | OXYGEN SATURATION: 96 % | SYSTOLIC BLOOD PRESSURE: 118 MMHG | WEIGHT: 216 LBS

## 2018-06-07 DIAGNOSIS — E66.01 SEVERE OBESITY (BMI 35.0-39.9): ICD-10-CM

## 2018-06-07 DIAGNOSIS — Z00.00 ROUTINE GENERAL MEDICAL EXAMINATION AT A HEALTH CARE FACILITY: ICD-10-CM

## 2018-06-07 DIAGNOSIS — E78.5 DYSLIPIDEMIA: ICD-10-CM

## 2018-06-07 DIAGNOSIS — F31.70 MIXED BIPOLAR I DISORDER IN REMISSION (HCC): ICD-10-CM

## 2018-06-07 DIAGNOSIS — Z71.89 ADVANCED CARE PLANNING/COUNSELING DISCUSSION: ICD-10-CM

## 2018-06-07 DIAGNOSIS — F31.70 MIXED BIPOLAR I DISORDER IN REMISSION (HCC): Primary | ICD-10-CM

## 2018-06-07 LAB
ALBUMIN SERPL-MCNC: 3.9 G/DL (ref 3.4–5)
ALBUMIN/GLOB SERPL: 1.1 {RATIO} (ref 0.8–1.7)
ALP SERPL-CCNC: 120 U/L (ref 45–117)
ALT SERPL-CCNC: 26 U/L (ref 13–56)
ANION GAP SERPL CALC-SCNC: 10 MMOL/L (ref 3–18)
AST SERPL-CCNC: 20 U/L (ref 15–37)
BASOPHILS # BLD: 0.1 K/UL (ref 0–0.06)
BASOPHILS NFR BLD: 1 % (ref 0–2)
BILIRUB SERPL-MCNC: 0.4 MG/DL (ref 0.2–1)
BUN SERPL-MCNC: 13 MG/DL (ref 7–18)
BUN/CREAT SERPL: 12 (ref 12–20)
CALCIUM SERPL-MCNC: 9.1 MG/DL (ref 8.5–10.1)
CHLORIDE SERPL-SCNC: 109 MMOL/L (ref 100–108)
CHOLEST SERPL-MCNC: 158 MG/DL
CO2 SERPL-SCNC: 30 MMOL/L (ref 21–32)
CREAT SERPL-MCNC: 1.08 MG/DL (ref 0.6–1.3)
DIFFERENTIAL METHOD BLD: ABNORMAL
EOSINOPHIL # BLD: 0.3 K/UL (ref 0–0.4)
EOSINOPHIL NFR BLD: 4 % (ref 0–5)
ERYTHROCYTE [DISTWIDTH] IN BLOOD BY AUTOMATED COUNT: 14.7 % (ref 11.6–14.5)
GLOBULIN SER CALC-MCNC: 3.6 G/DL (ref 2–4)
GLUCOSE SERPL-MCNC: 101 MG/DL (ref 74–99)
HCT VFR BLD AUTO: 43.2 % (ref 35–45)
HDLC SERPL-MCNC: 60 MG/DL (ref 40–60)
HDLC SERPL: 2.6 {RATIO} (ref 0–5)
HGB BLD-MCNC: 13.7 G/DL (ref 12–16)
LDLC SERPL CALC-MCNC: 71.6 MG/DL (ref 0–100)
LIPID PROFILE,FLP: NORMAL
LYMPHOCYTES # BLD: 3.1 K/UL (ref 0.9–3.6)
LYMPHOCYTES NFR BLD: 44 % (ref 21–52)
MCH RBC QN AUTO: 27.6 PG (ref 24–34)
MCHC RBC AUTO-ENTMCNC: 31.7 G/DL (ref 31–37)
MCV RBC AUTO: 86.9 FL (ref 74–97)
MONOCYTES # BLD: 0.6 K/UL (ref 0.05–1.2)
MONOCYTES NFR BLD: 8 % (ref 3–10)
NEUTS SEG # BLD: 2.9 K/UL (ref 1.8–8)
NEUTS SEG NFR BLD: 43 % (ref 40–73)
PLATELET # BLD AUTO: 265 K/UL (ref 135–420)
PMV BLD AUTO: 11.7 FL (ref 9.2–11.8)
POTASSIUM SERPL-SCNC: 4.8 MMOL/L (ref 3.5–5.5)
PROT SERPL-MCNC: 7.5 G/DL (ref 6.4–8.2)
RBC # BLD AUTO: 4.97 M/UL (ref 4.2–5.3)
SODIUM SERPL-SCNC: 149 MMOL/L (ref 136–145)
T4 FREE SERPL-MCNC: 0.8 NG/DL (ref 0.7–1.5)
TRIGL SERPL-MCNC: 132 MG/DL (ref ?–150)
TSH SERPL DL<=0.05 MIU/L-ACNC: 2.34 UIU/ML (ref 0.36–3.74)
VLDLC SERPL CALC-MCNC: 26.4 MG/DL
WBC # BLD AUTO: 6.9 K/UL (ref 4.6–13.2)

## 2018-06-07 PROCEDURE — 80053 COMPREHEN METABOLIC PANEL: CPT | Performed by: FAMILY MEDICINE

## 2018-06-07 PROCEDURE — 80061 LIPID PANEL: CPT | Performed by: FAMILY MEDICINE

## 2018-06-07 PROCEDURE — 82306 VITAMIN D 25 HYDROXY: CPT | Performed by: FAMILY MEDICINE

## 2018-06-07 PROCEDURE — 84439 ASSAY OF FREE THYROXINE: CPT | Performed by: FAMILY MEDICINE

## 2018-06-07 PROCEDURE — 84443 ASSAY THYROID STIM HORMONE: CPT | Performed by: FAMILY MEDICINE

## 2018-06-07 PROCEDURE — 85025 COMPLETE CBC W/AUTO DIFF WBC: CPT | Performed by: FAMILY MEDICINE

## 2018-06-07 NOTE — PROGRESS NOTES
Javed Long is a 61 y.o. female and presents for annual Medicare Wellness Visit. Problem List: Reviewed with patient and discussed risk factors.     Patient Active Problem List   Diagnosis Code    Dyslipidemia E78.5    Family history of premature CAD Z82.49    Severe [facility] 35.0-39.9) [facility] E66.01       Current medical providers:  Patient Care Team:  Alfred Yap MD as PCP - General (Family Practice)  Marybeth Bardales MD as Physician (Psychiatry)  Brittaney Sanches MD as Physician (Obstetrics & Gynecology)  Dianne Ames MD as Physician (Cardiology)  Nighat Welch NP (Nurse Practitioner)  Svitlana Kramer MD (Colon and Rectal Surgery)    PSH: Reviewed with patient  Past Surgical History:   Procedure Laterality Date    HX COLONOSCOPY  12/7/2015    Repeat colonoscopy in 10 yrs per Dr. Morena Hampton  varicose vein        SH: Reviewed with patient  Social History   Substance Use Topics    Smoking status: Former Smoker     Types: Cigarettes     Start date: 3/17/1968     Quit date: 3/17/1999    Smokeless tobacco: Never Used    Alcohol use 0.5 oz/week     1 Shots of liquor per week      Comment: 1-2 shots of liqueur/month       FH: Reviewed with patient  Family History   Problem Relation Age of Onset    Heart Attack Father     Other Father      Polio left hand, childhood onset    Alcohol abuse Mother     Bipolar Disorder Mother     Bipolar Disorder Brother     Attention Deficit Disorder Brother     Congenital heart defect Paternal Aunt     Alcohol abuse Brother     Bipolar Disorder Brother     Suicide Brother     Congenital heart defect Son     Attention Deficit Hyperactivity Disorder Son        Medications/Allergies: Reviewed with patient  Current Outpatient Prescriptions on File Prior to Visit   Medication Sig Dispense Refill    lovastatin (MEVACOR) 40 mg tablet TAKE 1/2 TABLET BY MOUTH ONCE DAILY AT BEDTIME 90 Tab 3    co-enzyme Q-10 (CO Q-10) 100 mg capsule Take 100 mg by mouth daily.  calcium carbonate-vitamin D3 600 mg (1,500 mg)-800 unit chew Take 1 Tab by mouth daily.  gabapentin (NEURONTIN) 300 mg capsule Take 300 mg by mouth two (2) times a day.  QUEtiapine SR (SEROQUEL XR) 300 mg sr tablet Take 100 mg by mouth nightly.  methylphenidate (RITALIN) 20 mg tablet Take 20 mg by mouth daily.  acetaminophen (TYLENOL) 325 mg tablet Take 650 mg by mouth every eight (8) hours as needed for Pain.  aspirin delayed-release 81 mg tablet Take 81 mg by mouth daily.  multivitamin (ONE A DAY) tablet Take 1 Tab by mouth daily.  ziprasidone (GEODON) 80 mg capsule Take 80 mg by mouth two (2) times daily (with meals). No current facility-administered medications on file prior to visit. Allergies   Allergen Reactions    Codeine Other (comments)     Pass out       Objective:  Visit Vitals    BP (!) 127/96 (BP 1 Location: Left arm, BP Patient Position: Sitting)    Pulse 80    Temp 97.3 °F (36.3 °C) (Oral)    Resp 18    Ht 5' 3\" (1.6 m)    Wt 216 lb (98 kg)    SpO2 96%    BMI 38.26 kg/m2    Body mass index is 38.26 kg/(m^2). Assessment of cognitive impairment: Alert and oriented x 3    Depression Screen:   PHQ over the last two weeks 6/7/2018   PHQ Not Done -   Little interest or pleasure in doing things Not at all   Feeling down, depressed or hopeless Not at all   Total Score PHQ 2 0       Fall Risk Assessment:    Fall Risk Assessment, last 12 mths 4/7/2014   Able to walk? Yes   Fall in past 12 months? No       Functional Ability:   Does the patient exhibit a steady gait? yes   How long did it take the patient to get up and walk from a sitting position? 4 secs   Is the patient self reliant?  (ie can do own laundry, meals, household chores)  yes     Does the patient handle his/her own medications? yes     Does the patient handle his/her own money?    yes     Is the patients home safe (ie good lighting, handrails on stairs and bath, etc.)? yes     Did you notice or did patient express any hearing difficulties? no     Did you notice or did patient express any vision difficulties? yes     Were distance and reading eye charts used? no       Advance Care Planning:   Patient was offered the opportunity to discuss advance care planning:  yes     Does patient have an Advance Directive:  no   If no, did you provide information on Caring Connections? yes       Plan:      No orders of the defined types were placed in this encounter. Health Maintenance   Topic Date Due    DTaP/Tdap/Td series (1 - Tdap) 05/03/1976    MEDICARE YEARLY EXAM  05/10/2018    Influenza Age 5 to Adult  08/01/2018    BREAST CANCER SCRN MAMMOGRAM  08/10/2019    PAP AKA CERVICAL CYTOLOGY  04/27/2020    COLONOSCOPY  12/07/2025    Hepatitis C Screening  Completed    Bone Densitometry (Dexa) Screening  Completed    ZOSTER VACCINE AGE 60>  Completed       *Patient verbalized understanding and agreement with the plan. A copy of the After Visit Summary with personalized health plan was given to the patient today.

## 2018-06-07 NOTE — PROGRESS NOTES
HISTORY OF PRESENT ILLNESS  Namita Summers is a 61 y.o. female. HPI Comments: Ms. Sean Gonzales is here to get a bunch of labs that her psychiatrist wants. She is also due for some that I would like. No complaints at this time. Review of Systems   Constitutional: Negative for chills and fever. HENT: Negative for congestion, ear pain and sore throat. Eyes: Negative for discharge and redness. Respiratory: Negative for cough and shortness of breath. Cardiovascular: Negative for chest pain, palpitations and orthopnea. Gastrointestinal: Negative for abdominal pain, nausea and vomiting. Genitourinary: Negative for frequency and urgency. Skin: Negative for rash. Neurological: Negative for weakness and headaches. Endo/Heme/Allergies: Does not bruise/bleed easily. Physical Exam   Constitutional: Vital signs are normal. She appears well-developed and well-nourished. HENT:   Right Ear: Tympanic membrane and ear canal normal.   Left Ear: Tympanic membrane and ear canal normal.   Nose: Nose normal.   Mouth/Throat: Uvula is midline, oropharynx is clear and moist and mucous membranes are normal.   Eyes: Pupils are equal, round, and reactive to light. Neck: No thyromegaly present. Cardiovascular: Normal rate, regular rhythm and normal heart sounds. Pulmonary/Chest: Effort normal and breath sounds normal. No respiratory distress. She has no wheezes. She has no rales. Abdominal: She exhibits no distension. There is no tenderness. Skin: No rash noted. Psychiatric: She has a normal mood and affect. Nursing note and vitals reviewed. ASSESSMENT and PLAN    ICD-10-CM ICD-9-CM    1. Mixed bipolar I disorder in remission ([facility]) F31.70 296.66 CBC WITH AUTOMATED DIFF      LIPID PANEL      T4, FREE      TSH 3RD GENERATION      VITAMIN D, 25 HYDROXY      METABOLIC PANEL, COMPREHENSIVE   2.  Dyslipidemia E78.5 272.4 LIPID PANEL      T4, FREE      TSH 3RD GENERATION      VITAMIN D, 25 HYDROXY      METABOLIC PANEL, COMPREHENSIVE   3.  Severe obesity (BMI 35.0-39.9) ([facility]) E66.01 278.01 T4, FREE      TSH 3RD GENERATION      VITAMIN D, 25 HYDROXY      METABOLIC PANEL, COMPREHENSIVE       AVS instructions reviewed with patient, pt verbalized understanding

## 2018-06-07 NOTE — PATIENT INSTRUCTIONS
We will send the lab results to your psychiatrist.    Merrill Chantell here will depend on lab results. Continue everything the same. Read that book about Advanced Care Planning and get one done.

## 2018-06-07 NOTE — PROGRESS NOTES
Rm 3  Pt presents to the clinic with a lab requisition from [facility] to have labs drawn. Depression Screening:  PHQ over the last two weeks 6/7/2018 1/26/2018 9/27/2017 5/9/2017 4/7/2014 3/17/2014 3/11/2014   PHQ Not Done - - - - - - Active Diagnosis of Depression or Bipolar Disorder   Little interest or pleasure in doing things Not at all Not at all Not at all Not at all Not at all Not at all -   Feeling down, depressed or hopeless Not at all Not at all Not at all Not at all Not at all Not at all -   Total Score PHQ 2 0 0 0 0 0 0 -       Learning Assessment:  Learning Assessment 5/9/2017 2/3/2017 4/7/2014 3/11/2014   PRIMARY LEARNER Patient Patient Patient Patient   HIGHEST LEVEL OF EDUCATION - PRIMARY LEARNER  GRADUATED HIGH SCHOOL OR GED GRADUATED HIGH SCHOOL OR GED - GRADUATED HIGH SCHOOL OR GED   BARRIERS PRIMARY LEARNER NONE NONE - [address] CAREGIVER No No - No   PRIMARY LANGUAGE ENGLISH ENGLISH ENGLISH ENGLISH   LEARNER PREFERENCE PRIMARY DEMONSTRATION DEMONSTRATION DEMONSTRATION LISTENING     - - - DEMONSTRATION   ANSWERED BY patient Patient - Patient   RELATIONSHIP SELF SELF - SELF       Abuse Screening:  Abuse Screening Questionnaire 9/27/2017 5/9/2017 2/3/2017   Do you ever feel afraid of your partner? N N N   Are you in a relationship with someone who physically or mentally threatens you? N N N   Is it safe for you to go home? [facility] reviewed and discussed per provider: yes     Coordination of Care:    1. Have you been to the ER, urgent care clinic since your last visit? Hospitalized since your last visit? no    2. Have you seen or consulted any other health care providers outside of the [address] [place] [address] since your last visit? Include any pap smears or colon screening.  no

## 2018-06-07 NOTE — MR AVS SNAPSHOT
[facility] [address] [address] 
[phone] Patient: Aries Jones MRN: [id number] TAHIR:[birth year] Visit Information Date & Time Provider Department Dept. Phone Encounter #  
 6/7/2018  9:45 AM [facility] [phone] [id number] Follow-up Instructions Return if symptoms worsen or fail to improve. Your Appointments 11/5/2018 10:15 AM  
Follow Up with Lavonia Schilder, MD  
[facility] Appt Note: 1 yr f/u  
 [facility] [address] [address] [address] Upcoming Health Maintenance Date Due DTaP/Tdap/Td series (1 - Tdap) 5/3/1976 Influenza Age 5 to Adult 8/1/2018 MEDICARE YEARLY EXAM 6/8/2019 BREAST CANCER SCRN MAMMOGRAM 8/10/2019 PAP AKA CERVICAL CYTOLOGY 4/27/2020 COLONOSCOPY 12/7/2025 Allergies as of 6/7/2018  Review Complete On: 6/7/2018 By: Lynette Adler LPN Severity Noted Reaction Type Reactions Codeine  10/09/2010    Other (comments) Pass out Current Immunizations  Reviewed on 9/9/2014 Name Date Influenza Vaccine 10/10/2013 Influenza Vaccine (Quad) PF 9/27/2017 Influenza Vaccine PF 9/9/2014 10:22 AM  
  
 Not reviewed this visit You Were Diagnosed With   
  
 Codes Comments Mixed bipolar I disorder [facility]    -  Primary ICD-10-CM: F31.70 ICD-9-CM: 296.66 Dyslipidemia     ICD-10-CM: E78.5 ICD-9-CM: 272.4 Severe obesity (BMI 35.0-39.9) (HCC)     ICD-10-CM: E66.01 
ICD-9-CM: 278.01 Vitals BP Pulse Temp Resp Height(growth percentile) Weight(growth percentile) 118/76 80 97.3 °F (36.3 °C) (Oral) 18 5' 3\" (1.6 m) 216 lb (98 kg) SpO2 BMI OB Status Smoking Status 96% 38.26 kg/m2 Hysterectomy Former Smoker Vitals History BMI and BSA Data Body Mass Index Body Surface Area  
 38.26 kg/m 2 2.09 m 2 Preferred Pharmacy Pharmacy Name Phone [id number], [address] [place] [address] [place] [phone] Your Updated Medication List  
  
   
This list is accurate as of 6/7/18 10:10 AM.  Always use your most recent med list.  
  
  
  
  
 acetaminophen 325 mg tablet Commonly known as:  TYLENOL Take 650 mg by mouth every eight (8) hours as needed for Pain. aspirin delayed-release 81 mg tablet Take 81 mg by mouth daily. calcium carbonate-vitamin D3 600 mg (1,500 mg)-800 unit [place] Take 1 Tab by mouth daily. CO Q-10 100 mg capsule Generic drug:  co-enzyme Q-10 Take 100 mg by mouth daily. gabapentin 300 mg capsule Commonly known as:  NEURONTIN Take 300 mg by mouth two (2) times a day. GEODON 80 mg capsule Generic drug:  ziprasidone Take 80 mg by mouth two (2) times daily (with meals). lovastatin 40 mg tablet Commonly known as:  MEVACOR  
TAKE 1/2 TABLET BY MOUTH ONCE DAILY AT BEDTIME  
  
 methylphenidate HCl 20 mg tablet Commonly known as:  RITALIN Take 20 mg by mouth daily. multivitamin tablet Commonly known as:  ONE A DAY Take 1 Tab by mouth daily. SEROquel  mg sr tablet Generic drug:  QUEtiapine SR Take 100 mg by mouth nightly. Follow-up Instructions Return if symptoms worsen or fail to improve. Patient Instructions We will send the lab results to your psychiatrist. 
 
Sakshi Paola here will depend on lab results. Continue everything the same. Read that book about Advanced Care Planning and get one done. Introducing [facility] HE[facility]! Chapis Mccarthy introduces [employer] patient portal. Now you can access parts of your medical record, email your doctor's office, and request medication refills online. 1. In your internet browser, go to [email or web address] 2. Click on the First Time User? Click Here link in the Sign In box. You will see the New Member Sign Up page. 3. Enter your [employer]Cell Access Code exactly as it appears below. You will not need to use this code after youve completed the sign-up process. If you do not sign up before the expiration date, you must request a new code. · [employer]Cell Access Code: [email or web address] Expires: 9/5/2018 10:10 AM 
 
4. Enter the last four digits of your Social Security Number (xxxx) and Date of Birth (mm/dd/yyyy) as indicated and click Submit. You will be taken to the next sign-up page. 5. Create a ACell ID. This will be your [employer]Cell login ID and cannot be changed, so think of one that is secure and easy to remember. 6. Create a [employer]Cell password. You can change your password at any time. 7. Enter your Password Reset Question and Answer. This can be used at a later time if you forget your password. 8. Enter your e-mail address. You will receive e-mail notification when new information is available in [address] 9. Click Sign Up. You can now view and download portions of your medical record. 10. Click the Download Summary menu link to download a portable copy of your medical information. If you have questions, please visit the Frequently Asked Questions section of the [employer] website. Remember, ACell is NOT to be used for urgent needs. For medical emergencies, dial 911. Now available from your iPhone and Android! Please provide this summary of care documentation to your next provider. Your primary care clinician is listed as Uday Pinto. If you have any questions after today's visit, please call [phone].

## 2018-06-08 LAB — 25(OH)D3 SERPL-MCNC: 41.7 NG/ML (ref 30–100)

## 2018-12-05 ENCOUNTER — OFFICE VISIT (OUTPATIENT)
Dept: CARDIOLOGY CLINIC | Age: 63
End: 2018-12-05

## 2018-12-05 ENCOUNTER — HOSPITAL ENCOUNTER (OUTPATIENT)
Dept: LAB | Age: 63
Discharge: HOME | End: 2018-12-05
Payer: MEDICARE

## 2018-12-05 VITALS
WEIGHT: 216 LBS | DIASTOLIC BLOOD PRESSURE: 84 MMHG | BODY MASS INDEX: 38.27 KG/M2 | HEART RATE: 77 BPM | SYSTOLIC BLOOD PRESSURE: 131 MMHG | HEIGHT: 63 IN | OXYGEN SATURATION: 97 %

## 2018-12-05 DIAGNOSIS — E66.01 SEVERE OBESITY WITH BODY MASS INDEX (BMI) OF 35.0 TO 39.9 WITH SERIOUS COMORBIDITY (HCC): ICD-10-CM

## 2018-12-05 DIAGNOSIS — Z82.49 FAMILY HISTORY OF PREMATURE CAD: ICD-10-CM

## 2018-12-05 DIAGNOSIS — E78.5 DYSLIPIDEMIA: Primary | ICD-10-CM

## 2018-12-05 LAB
CHOLEST SERPL-MCNC: 171 MG/DL
HDLC SERPL-MCNC: 62 MG/DL (ref 40–60)
HDLC SERPL: 2.8 {RATIO} (ref 0–5)
LDLC SERPL CALC-MCNC: 88.2 MG/DL (ref 0–100)
LIPID PROFILE,FLP: ABNORMAL
TRIGL SERPL-MCNC: 104 MG/DL (ref ?–150)
VLDLC SERPL CALC-MCNC: 20.8 MG/DL

## 2018-12-05 PROCEDURE — 80061 LIPID PANEL: CPT

## 2018-12-05 PROCEDURE — 36415 COLL VENOUS BLD VENIPUNCTURE: CPT

## 2018-12-05 NOTE — PATIENT INSTRUCTIONS
Lipid-Labs drawn in [facility] [address]. You will have to register in the main lobby before your labs can be drawn. Their hours of operation are Monday through Friday 7am-5:30pm and Saturday 7am-1pm. If you have and questions regarding directions please contact them at [phone].

## 2018-12-05 NOTE — PROGRESS NOTES
1. Have you been to the ER, urgent care clinic since your last visit? Hospitalized since your last visit? No    2. Have you seen or consulted any other health care providers outside of the [address] [place] [address] since your last visit? Include any pap smears or colon screening.  No

## 2018-12-14 ENCOUNTER — TELEPHONE (OUTPATIENT)
Dept: CARDIOLOGY CLINIC | Age: 63
End: 2018-12-14

## 2018-12-14 NOTE — TELEPHONE ENCOUNTER
Incoming from pt pt. Two patient Identifiers confirmed. Advised she wanted to know the results of her lipid. Advised all values where within normal range. Pt verbalized understanding and asked that lab be mailed to her house. Address confirmed. Paperwork place to be mailed.

## 2018-12-16 NOTE — PROGRESS NOTES
Subjective:      Barrie Dey is in the office today for cardiac reevaluation. She is a 70-year-old woman with a history of dyslipidemia and a family history of premature coronary disease. She was last seen in this office in November 2017. At that time, she was doing well without any particular complaints. She has been on lovastatin for her elevated cholesterol in the past.  Her last profile done in June of 2018 is as listed below. In the office today, she reports that she is feeling fine. She has relocated recently. Her breathing has been good. She has had no chest pain. She has had no palpitations or peripheral swelling. She has been trying to exercise more frequently. Patient Active Problem List    Diagnosis Date Noted    Severe obesity with body mass index (BMI) of 35.0 to 39.9 with serious comorbidity ([facility] [address] 06/07/2018    Advanced care planning/counseling discussion 06/07/2018    Family history of premature CAD 11/12/2017    Dyslipidemia      Current Outpatient Medications   Medication Sig Dispense Refill    lovastatin (MEVACOR) 40 mg tablet TAKE 1/2 TABLET BY MOUTH ONCE DAILY AT BEDTIME 90 Tab 3    co-enzyme Q-10 (CO Q-10) 100 mg capsule Take 100 mg by mouth daily.  calcium carbonate-vitamin D3 600 mg (1,500 mg)-800 unit chew Take 1 Tab by mouth daily.  gabapentin (NEURONTIN) 300 mg capsule Take 300 mg by mouth two (2) times a day.  QUEtiapine SR (SEROQUEL XR) 300 mg sr tablet Take 100 mg by mouth nightly.  methylphenidate (RITALIN) 20 mg tablet Take 20 mg by mouth daily.  acetaminophen (TYLENOL) 325 mg tablet Take 650 mg by mouth every eight (8) hours as needed for Pain.  aspirin delayed-release 81 mg tablet Take 81 mg by mouth daily.  multivitamin (ONE A DAY) tablet Take 1 Tab by mouth daily.  ziprasidone (GEODON) 80 mg capsule Take 80 mg by mouth two (2) times daily (with meals).        Allergies   Allergen Reactions  Codeine Other (comments)     Pass out     Past Medical History:   Diagnosis Date    Attention deficit hyperactivity disorder     Bipolar disorder     Depression     nos    Dyslipidemia     Fetal alcohol syndrome      Past Surgical History:   Procedure Laterality Date    HX COLONOSCOPY  2015    Repeat colonoscopy in 10 yrs per Dr. Ruiz        varicose vein     Family History   Problem Relation Age of Onset    Heart Attack Father     Other Father         Polio left hand, childhood onset    Alcohol abuse Mother     Bipolar Disorder Mother     Bipolar Disorder Brother     Attention Deficit Disorder Brother     Congenital heart defect Paternal Aunt     Alcohol abuse Brother     Bipolar Disorder Brother     Suicide Brother     Congenital heart defect Son     Attention Deficit Hyperactivity Disorder Son      Social History     Tobacco Use   Smoking Status Former Smoker    Types: Cigarettes    Start date: 3/17/1968    Last attempt to quit: 3/17/1999    Years since quittin.7   Smokeless Tobacco Never Used          Review of Systems, additional:  Constitutional: negative  Eyes: negative  Respiratory: negative  Cardiovascular: negative  Gastrointestinal: negative  Musculoskeletal:negative  Neurological: negative  Behvioral/Psych: negative  Endocrine: negative  ENT: negative    Objective:     Visit Vitals  /84   Pulse 77   Ht 5' 3\" (1.6 m)   Wt 216 lb (98 kg)   SpO2 97%   BMI 38.26 kg/m²     General:  alert, cooperative, no distress   Chest Wall: inspection normal - no chest wall deformities or tenderness, respiratory effort normal   Lung: clear to auscultation bilaterally   Heart:  normal rate and regular rhythm, S1 and S2 normal, no murmurs noted, no gallops noted   Abdomen: soft, non-tender.  Bowel sounds normal. No masses,  no organomegaly   Extremities: extremities normal, atraumatic, no cyanosis or edema Skin: no rashes   Neuro: alert, oriented, normal speech, no focal findings or movement disorder noted         Assessment/Plan:       ICD-10-CM ICD-9-CM    1. Dyslipidemia, Lipid profile done 06/2018; CHOL 158, HDL 60, LDL 1 2, . Continue lovastatin. RT 1 year. E78.5 272.4    2. Family history of premature CAD, no symptoms suggestive of ischemic heart disease at present.   Return in 1 year Z82.49 V17.3

## 2019-03-18 ENCOUNTER — OFFICE VISIT (OUTPATIENT)
Dept: INTERNAL MEDICINE CLINIC | Age: 64
End: 2019-03-18

## 2019-03-18 ENCOUNTER — HOSPITAL ENCOUNTER (OUTPATIENT)
Dept: LAB | Age: 64
Discharge: HOME | End: 2019-03-18
Payer: MEDICARE

## 2019-03-18 VITALS
TEMPERATURE: 98.7 F | DIASTOLIC BLOOD PRESSURE: 73 MMHG | HEART RATE: 92 BPM | HEIGHT: 63 IN | SYSTOLIC BLOOD PRESSURE: 114 MMHG | BODY MASS INDEX: 38.62 KG/M2 | WEIGHT: 218 LBS | RESPIRATION RATE: 14 BRPM | OXYGEN SATURATION: 95 %

## 2019-03-18 DIAGNOSIS — E55.9 VITAMIN D DEFICIENCY: ICD-10-CM

## 2019-03-18 DIAGNOSIS — F31.9 BIPOLAR 1 DISORDER (HCC): Primary | ICD-10-CM

## 2019-03-18 DIAGNOSIS — R73.01 IMPAIRED FASTING GLUCOSE: ICD-10-CM

## 2019-03-18 DIAGNOSIS — F31.9 BIPOLAR 1 DISORDER (HCC): ICD-10-CM

## 2019-03-18 LAB
ALBUMIN SERPL-MCNC: 4 G/DL (ref 3.4–5)
ALBUMIN/GLOB SERPL: 1.1 {RATIO} (ref 0.8–1.7)
ALP SERPL-CCNC: 128 U/L (ref 45–117)
ALT SERPL-CCNC: 24 U/L (ref 13–56)
ANION GAP SERPL CALC-SCNC: 9 MMOL/L (ref 3–18)
AST SERPL-CCNC: 18 U/L (ref 15–37)
BASOPHILS # BLD: 0.1 K/UL (ref 0–0.1)
BASOPHILS NFR BLD: 1 % (ref 0–2)
BILIRUB SERPL-MCNC: 0.3 MG/DL (ref 0.2–1)
BUN SERPL-MCNC: 12 MG/DL (ref 7–18)
BUN/CREAT SERPL: 13 (ref 12–20)
CALCIUM SERPL-MCNC: 9.3 MG/DL (ref 8.5–10.1)
CHLORIDE SERPL-SCNC: 106 MMOL/L (ref 100–108)
CO2 SERPL-SCNC: 29 MMOL/L (ref 21–32)
CREAT SERPL-MCNC: 0.92 MG/DL (ref 0.6–1.3)
DIFFERENTIAL METHOD BLD: ABNORMAL
EOSINOPHIL # BLD: 0.6 K/UL (ref 0–0.4)
EOSINOPHIL NFR BLD: 7 % (ref 0–5)
ERYTHROCYTE [DISTWIDTH] IN BLOOD BY AUTOMATED COUNT: 15.2 % (ref 11.6–14.5)
GLOBULIN SER CALC-MCNC: 3.6 G/DL (ref 2–4)
GLUCOSE SERPL-MCNC: 85 MG/DL (ref 74–99)
HBA1C MFR BLD: 5.6 % (ref 4.2–5.6)
HCT VFR BLD AUTO: 42.9 % (ref 35–45)
HGB BLD-MCNC: 13.4 G/DL (ref 12–16)
LYMPHOCYTES # BLD: 3.4 K/UL (ref 0.9–3.6)
LYMPHOCYTES NFR BLD: 40 % (ref 21–52)
MCH RBC QN AUTO: 27 PG (ref 24–34)
MCHC RBC AUTO-ENTMCNC: 31.2 G/DL (ref 31–37)
MCV RBC AUTO: 86.5 FL (ref 74–97)
MONOCYTES # BLD: 0.7 K/UL (ref 0.05–1.2)
MONOCYTES NFR BLD: 8 % (ref 3–10)
NEUTS SEG # BLD: 3.7 K/UL (ref 1.8–8)
NEUTS SEG NFR BLD: 44 % (ref 40–73)
PLATELET # BLD AUTO: 260 K/UL (ref 135–420)
PMV BLD AUTO: 11.4 FL (ref 9.2–11.8)
POTASSIUM SERPL-SCNC: 4.3 MMOL/L (ref 3.5–5.5)
PROT SERPL-MCNC: 7.6 G/DL (ref 6.4–8.2)
RBC # BLD AUTO: 4.96 M/UL (ref 4.2–5.3)
SODIUM SERPL-SCNC: 144 MMOL/L (ref 136–145)
TSH SERPL DL<=0.05 MIU/L-ACNC: 1.2 UIU/ML (ref 0.36–3.74)
WBC # BLD AUTO: 8.3 K/UL (ref 4.6–13.2)

## 2019-03-18 PROCEDURE — 80053 COMPREHEN METABOLIC PANEL: CPT

## 2019-03-18 PROCEDURE — 83036 HEMOGLOBIN GLYCOSYLATED A1C: CPT

## 2019-03-18 PROCEDURE — 84443 ASSAY THYROID STIM HORMONE: CPT

## 2019-03-18 PROCEDURE — 36415 COLL VENOUS BLD VENIPUNCTURE: CPT

## 2019-03-18 PROCEDURE — 85025 COMPLETE CBC W/AUTO DIFF WBC: CPT

## 2019-03-18 PROCEDURE — 82306 VITAMIN D 25 HYDROXY: CPT

## 2019-03-18 RX ORDER — METHYLPHENIDATE HYDROCHLORIDE 10 MG/1
15 TABLET ORAL DAILY
COMMUNITY
End: 2020-09-23 | Stop reason: ALTCHOICE

## 2019-03-18 NOTE — PROGRESS NOTES
HISTORY OF PRESENT ILLNESS  Namita Johnson is a 61 y.o. female. HPI  Ms. Chito Hatch presents for lab completion. She takes many medications via her psychiatrist and was advised of routine lab. No c/o. ROS  Visit Vitals  /73 (BP 1 Location: Right arm, BP Patient Position: Sitting)   Pulse 92   Temp 98.7 °F (37.1 °C) (Oral)   Resp 14   Ht 5' 3\" (1.6 m)   Wt 218 lb (98.9 kg)   SpO2 95%   BMI 38.62 kg/m²       Physical Exam   Constitutional: She is oriented to person, place, and time. She appears well-developed and well-nourished. No distress. HENT:   Head: Normocephalic and atraumatic. Right Ear: Tympanic membrane, external ear and ear canal normal.   Left Ear: Tympanic membrane, external ear and ear canal normal.   Nose: Nose normal.   Mouth/Throat: Uvula is midline, oropharynx is clear and moist and mucous membranes are normal. No oropharyngeal exudate, posterior oropharyngeal edema, posterior oropharyngeal erythema or tonsillar abscesses. Eyes: Conjunctivae are normal. Pupils are equal, round, and reactive to light. No scleral icterus. Neck: Neck supple. Cardiovascular: Normal rate, regular rhythm and normal heart sounds. Exam reveals no gallop. No murmur heard. Pulses:       Dorsalis pedis pulses are 2+ on the right side, and 2+ on the left side. Posterior tibial pulses are 2+ on the right side, and 2+ on the left side. No pedal edema. Pulmonary/Chest: Effort normal and breath sounds normal. No respiratory distress. She has no decreased breath sounds. She has no wheezes. She has no rhonchi. She has no rales. Lymphadenopathy:        Head (right side): No submandibular and no tonsillar adenopathy present. Head (left side): No submandibular and no tonsillar adenopathy present. She has no cervical adenopathy. Right: No supraclavicular adenopathy present. Left: No supraclavicular adenopathy present.    Neurological: She is alert and oriented to person, place, and time. Skin: Skin is warm and dry. Psychiatric: She has a normal mood and affect. Her speech is normal.       ASSESSMENT and PLAN  Diagnoses and all orders for this visit:    1. Bipolar 1 disorder (HCC)  -     CBC WITH AUTOMATED DIFF; Future  -     METABOLIC PANEL, COMPREHENSIVE; Future  -     TSH 3RD GENERATION; Future    2. Impaired fasting glucose  -     HEMOGLOBIN A1C W/O EAG; Future    3. Vitamin D deficiency  -     VITAMIN D, 25 HYDROXY; Future      Follow-up Disposition:  Return for based on results - patient desires a letter of results unless needing seen.

## 2019-03-19 LAB — 25(OH)D3 SERPL-MCNC: 41.9 NG/ML (ref 30–100)

## 2019-04-09 ENCOUNTER — TELEPHONE (OUTPATIENT)
Dept: INTERNAL MEDICINE CLINIC | Age: 64
End: 2019-04-09

## 2019-04-09 NOTE — TELEPHONE ENCOUNTER
Pt requesting a new order for Pt @ [facility]     Address: [address] [place]   Phone: [phone]

## 2019-04-09 NOTE — LETTER
4/12/2019 10:54 AM 
 
Ms. Waldo Morris [address] [facility] [id number] Dear Tita Fields: 
 
I hope this letter finds you well. I am a Licensed Practical Nurse with [facility] and I have attempted to contact you by phone, but was unsuccessful. Your good health is important to us. As always, our goal is to be your partner in life-long wellness. Please contact our office at your earliest convenience. If you have any questions, please do not hesitate to give us a call at the number listed above. Sincerely, Shmuel Mehta LPN

## 2019-04-10 NOTE — TELEPHONE ENCOUNTER
Needs appt. We have not referred her to PT previously, and she will need to be evaluated so that I can appropriately order PT with a justifying diagnosis.

## 2019-04-10 NOTE — TELEPHONE ENCOUNTER
Attempted to contact pt at  number, no answer. Lvm for pt to return call to office at [phone]. Will continue to try to contact pt.

## 2019-04-12 NOTE — TELEPHONE ENCOUNTER
Attempted to contact patient at  number, no answer. Lvm for patient to return call to office at [phone]. I have been unable to reach this patient by phone. A letter is being sent to the last known home address.

## 2019-04-22 ENCOUNTER — OFFICE VISIT (OUTPATIENT)
Dept: INTERNAL MEDICINE CLINIC | Age: 64
End: 2019-04-22

## 2019-04-22 VITALS
HEIGHT: 63 IN | TEMPERATURE: 98.7 F | DIASTOLIC BLOOD PRESSURE: 77 MMHG | HEART RATE: 90 BPM | WEIGHT: 221 LBS | SYSTOLIC BLOOD PRESSURE: 113 MMHG | RESPIRATION RATE: 14 BRPM | OXYGEN SATURATION: 93 % | BODY MASS INDEX: 39.16 KG/M2

## 2019-04-22 DIAGNOSIS — G89.29 CHRONIC BILATERAL LOW BACK PAIN WITHOUT SCIATICA: Primary | ICD-10-CM

## 2019-04-22 DIAGNOSIS — M54.50 CHRONIC BILATERAL LOW BACK PAIN WITHOUT SCIATICA: Primary | ICD-10-CM

## 2019-04-22 NOTE — PROGRESS NOTES
HISTORY OF PRESENT ILLNESS  Namita Jimenez is a 61 y.o. female. HPI  Ms. Vianca Ashley presents to inquire about physical therapy for lower back pain. She c/o bilateral lower back/upper buttock pain x 1 year, relatively unchanged since onset. Denies prior injury or trauma. Denies radiating leg pain. Denies leg paresthesias. - Taking prn Tylenol which helps. She takes this when she walks longer distances, once every few weeks. She admits to a sedentary lifestyle stating she watches a lot of TV. Review of Systems   Gastrointestinal:        No bowel incontinence/dysfunction. Genitourinary:        No bladder incontinence/dysfunction. Musculoskeletal: Positive for back pain. No radiating leg pain. Neurological: Negative for tingling, sensory change and focal weakness. Visit Vitals  /77 (BP 1 Location: Right arm, BP Patient Position: Sitting)   Pulse 90   Temp 98.7 °F (37.1 °C) (Oral)   Resp 14   Ht 5' 3\" (1.6 m)   Wt 221 lb (100.2 kg)   SpO2 93%   BMI 39.15 kg/m²       Physical Exam   Constitutional: She is oriented to person, place, and time. She appears well-developed and well-nourished. Eyes: Pupils are equal, round, and reactive to light. Neck: Normal range of motion. Neck supple. Cardiovascular: Normal rate and regular rhythm. Pulmonary/Chest: Breath sounds normal. She exhibits no tenderness. Abdominal: Soft. There is no CVA tenderness. Musculoskeletal: She exhibits tenderness. Lumbar back: She exhibits no tenderness, no bony tenderness, no swelling, no edema and no deformity. Back:    Tender lumbar muscles. Non-tender vertebrae. Decreased ROM due to pain. No appreciable spasm. Neurological: She is alert and oriented to person, place, and time. She has normal strength and normal reflexes. No sensory deficit. Reflex Scores:       Patellar reflexes are 2+ on the right side and 2+ on the left side.   Strength, sensory exams grossly normal. No localizing neurological signs. Skin: Skin is warm and dry. No rash noted. Nursing note and vitals reviewed. ASSESSMENT and PLAN  Diagnoses and all orders for this visit:    1. Chronic bilateral low back pain without sciatica  -     REFERRAL TO PHYSICAL THERAPY   - Patient desires [facility] due to its location across from her place of living.   - Continue prn Tylenol.  - Advised of gradual increase in routine walking. Will be able to walk farther and faster with consistent walking for exercise. Follow-up and Dispositions    · Return if symptoms worsen or fail to improve.

## 2019-04-22 NOTE — PROGRESS NOTES
Patient presents to the clinic for a referral to physical therapy due to lower back pain and bilateral leg pain.

## 2019-06-02 RX ORDER — LOVASTATIN 40 MG/1
TABLET ORAL
Qty: 30 TAB | Refills: 1 | Status: SHIPPED | OUTPATIENT
Start: 2019-06-02 | End: 2019-08-28 | Stop reason: SDUPTHER

## 2019-08-28 RX ORDER — LOVASTATIN 40 MG/1
TABLET ORAL
Qty: 45 TAB | Refills: 0 | Status: SHIPPED | OUTPATIENT
Start: 2019-08-28 | End: 2019-11-27 | Stop reason: SDUPTHER

## 2019-11-28 RX ORDER — LOVASTATIN 40 MG/1
TABLET ORAL
Qty: 45 TAB | Refills: 0 | Status: SHIPPED | OUTPATIENT
Start: 2019-11-28 | End: 2020-01-23

## 2019-12-23 ENCOUNTER — HOSPITAL ENCOUNTER (EMERGENCY)
Age: 64
Discharge: HOME | End: 2019-12-23
Attending: EMERGENCY MEDICINE
Payer: MEDICARE

## 2019-12-23 ENCOUNTER — APPOINTMENT (OUTPATIENT)
Dept: GENERAL RADIOLOGY | Age: 64
End: 2019-12-23
Attending: EMERGENCY MEDICINE
Payer: MEDICARE

## 2019-12-23 VITALS
DIASTOLIC BLOOD PRESSURE: 95 MMHG | OXYGEN SATURATION: 97 % | RESPIRATION RATE: 18 BRPM | WEIGHT: 236 LBS | HEART RATE: 97 BPM | HEIGHT: 63 IN | TEMPERATURE: 98.5 F | SYSTOLIC BLOOD PRESSURE: 139 MMHG | BODY MASS INDEX: 41.82 KG/M2

## 2019-12-23 DIAGNOSIS — R07.89 CHEST WALL PAIN: Primary | ICD-10-CM

## 2019-12-23 LAB
ALBUMIN SERPL-MCNC: 3.8 G/DL (ref 3.4–5)
ALBUMIN/GLOB SERPL: 1.1 {RATIO} (ref 0.8–1.7)
ALP SERPL-CCNC: 108 U/L (ref 45–117)
ALT SERPL-CCNC: 27 U/L (ref 13–56)
ANION GAP SERPL CALC-SCNC: 7 MMOL/L (ref 3–18)
AST SERPL-CCNC: 26 U/L (ref 10–38)
BASOPHILS # BLD: 0 K/UL (ref 0–0.1)
BASOPHILS NFR BLD: 1 % (ref 0–2)
BILIRUB SERPL-MCNC: 0.3 MG/DL (ref 0.2–1)
BUN SERPL-MCNC: 20 MG/DL (ref 7–18)
BUN/CREAT SERPL: 20 (ref 12–20)
CALCIUM SERPL-MCNC: 8.7 MG/DL (ref 8.5–10.1)
CHLORIDE SERPL-SCNC: 107 MMOL/L (ref 100–111)
CO2 SERPL-SCNC: 25 MMOL/L (ref 21–32)
CREAT SERPL-MCNC: 0.98 MG/DL (ref 0.6–1.3)
DIFFERENTIAL METHOD BLD: NORMAL
EOSINOPHIL # BLD: 0.2 K/UL (ref 0–0.4)
EOSINOPHIL NFR BLD: 2 % (ref 0–5)
ERYTHROCYTE [DISTWIDTH] IN BLOOD BY AUTOMATED COUNT: 14 % (ref 11.6–14.5)
GLOBULIN SER CALC-MCNC: 3.5 G/DL (ref 2–4)
GLUCOSE SERPL-MCNC: 117 MG/DL (ref 74–99)
HCT VFR BLD AUTO: 41.6 % (ref 35–45)
HGB BLD-MCNC: 13.6 G/DL (ref 12–16)
LYMPHOCYTES # BLD: 2.5 K/UL (ref 0.9–3.6)
LYMPHOCYTES NFR BLD: 31 % (ref 21–52)
MCH RBC QN AUTO: 29 PG (ref 24–34)
MCHC RBC AUTO-ENTMCNC: 32.7 G/DL (ref 31–37)
MCV RBC AUTO: 88.7 FL (ref 74–97)
MONOCYTES # BLD: 0.6 K/UL (ref 0.05–1.2)
MONOCYTES NFR BLD: 7 % (ref 3–10)
NEUTS SEG # BLD: 4.9 K/UL (ref 1.8–8)
NEUTS SEG NFR BLD: 59 % (ref 40–73)
PLATELET # BLD AUTO: 216 K/UL (ref 135–420)
PMV BLD AUTO: 10.3 FL (ref 9.2–11.8)
POTASSIUM SERPL-SCNC: 3.8 MMOL/L (ref 3.5–5.5)
PROT SERPL-MCNC: 7.3 G/DL (ref 6.4–8.2)
RBC # BLD AUTO: 4.69 M/UL (ref 4.2–5.3)
SODIUM SERPL-SCNC: 139 MMOL/L (ref 136–145)
TROPONIN I SERPL-MCNC: <0.02 NG/ML (ref 0–0.04)
WBC # BLD AUTO: 8.3 K/UL (ref 4.6–13.2)

## 2019-12-23 PROCEDURE — 80053 COMPREHEN METABOLIC PANEL: CPT

## 2019-12-23 PROCEDURE — 96374 THER/PROPH/DIAG INJ IV PUSH: CPT

## 2019-12-23 PROCEDURE — 85025 COMPLETE CBC W/AUTO DIFF WBC: CPT

## 2019-12-23 PROCEDURE — 74011250636 HC RX REV CODE- 250/636: Performed by: EMERGENCY MEDICINE

## 2019-12-23 PROCEDURE — 74011000250 HC RX REV CODE- 250: Performed by: EMERGENCY MEDICINE

## 2019-12-23 PROCEDURE — 94760 N-INVAS EAR/PLS OXIMETRY 1: CPT

## 2019-12-23 PROCEDURE — 99284 EMERGENCY DEPT VISIT MOD MDM: CPT

## 2019-12-23 PROCEDURE — 84484 ASSAY OF TROPONIN QUANT: CPT

## 2019-12-23 PROCEDURE — 93005 ELECTROCARDIOGRAM TRACING: CPT

## 2019-12-23 PROCEDURE — 71046 X-RAY EXAM CHEST 2 VIEWS: CPT

## 2019-12-23 PROCEDURE — 74011250637 HC RX REV CODE- 250/637: Performed by: EMERGENCY MEDICINE

## 2019-12-23 RX ORDER — ACETAMINOPHEN 500 MG
1000 TABLET ORAL
Qty: 50 TAB | Refills: 0 | Status: SHIPPED | OUTPATIENT
Start: 2019-12-23

## 2019-12-23 RX ORDER — DIAZEPAM 5 MG/1
5 TABLET ORAL
Status: COMPLETED | OUTPATIENT
Start: 2019-12-23 | End: 2019-12-23

## 2019-12-23 RX ORDER — KETOROLAC TROMETHAMINE 15 MG/ML
15 INJECTION, SOLUTION INTRAMUSCULAR; INTRAVENOUS
Status: COMPLETED | OUTPATIENT
Start: 2019-12-23 | End: 2019-12-23

## 2019-12-23 RX ORDER — IBUPROFEN 600 MG/1
600 TABLET ORAL
Qty: 20 TAB | Refills: 0 | Status: SHIPPED | OUTPATIENT
Start: 2019-12-23

## 2019-12-23 RX ORDER — ACETAMINOPHEN 500 MG
1000 TABLET ORAL
Status: COMPLETED | OUTPATIENT
Start: 2019-12-23 | End: 2019-12-23

## 2019-12-23 RX ORDER — LIDOCAINE 50 MG/G
PATCH TOPICAL
Qty: 1 PACKAGE | Refills: 1 | Status: SHIPPED | OUTPATIENT
Start: 2019-12-23 | End: 2022-06-03 | Stop reason: ALTCHOICE

## 2019-12-23 RX ORDER — LIDOCAINE 4 G/100G
1 PATCH TOPICAL
Status: DISCONTINUED | OUTPATIENT
Start: 2019-12-23 | End: 2019-12-24 | Stop reason: HOSPADM

## 2019-12-23 RX ADMIN — DIAZEPAM 5 MG: 5 TABLET ORAL at 21:45

## 2019-12-23 RX ADMIN — KETOROLAC TROMETHAMINE 15 MG: 15 INJECTION, SOLUTION INTRAMUSCULAR; INTRAVENOUS at 21:44

## 2019-12-23 RX ADMIN — SODIUM CHLORIDE 1000 ML: 900 INJECTION, SOLUTION INTRAVENOUS at 21:43

## 2019-12-23 RX ADMIN — ACETAMINOPHEN 1000 MG: 500 TABLET, FILM COATED ORAL at 21:45

## 2019-12-24 LAB
ATRIAL RATE: 120 BPM
CALCULATED P AXIS, ECG09: 42 DEGREES
CALCULATED R AXIS, ECG10: 53 DEGREES
CALCULATED T AXIS, ECG11: -15 DEGREES
DIAGNOSIS, 93000: NORMAL
P-R INTERVAL, ECG05: 162 MS
Q-T INTERVAL, ECG07: 322 MS
QRS DURATION, ECG06: 74 MS
QTC CALCULATION (BEZET), ECG08: 455 MS
VENTRICULAR RATE, ECG03: 120 BPM

## 2019-12-24 NOTE — DISCHARGE INSTRUCTIONS

## 2019-12-24 NOTE — ED TRIAGE NOTES
Patient states yesterday morning she \"leaned over and pulled a muscle, I know I pulled a muscle\". No OTC pain medication used since this morning. Patient states she arrived to triage via ambulance.

## 2019-12-24 NOTE — ED PROVIDER NOTES
Mohamud Nelson is a 59 y.o. female with history of bipolar disorder who states she bent over earlier in the day and felt a sharp pain in her mid chest and has kept having pain in the same area since. It is worse with any movement palpation or breathing. She denies any cough, congestion, fever. There is no syncopal event. She denies any arm pain or tingling. There is no abdominal pain. She has had no nausea or vomiting pain. Patient had no symptoms until she bent over and felt a pinch in her chest.  She has not take anything for tonight. No prior history of PE or DVT. No recent leg pain or swelling. No recent immobilization. She does not have active cancer. The history is provided by the patient.         Past Medical History:   Diagnosis Date    Attention deficit hyperactivity disorder     Bipolar disorder     Depression     nos    Dyslipidemia     Fetal alcohol syndrome        Past Surgical History:   Procedure Laterality Date    HX COLONOSCOPY  12/7/2015    Repeat colonoscopy in 10 yrs per Dr. Martínez Vargas      2001  varicose vein         Family History:   Problem Relation Age of Onset    Heart Attack Father     Other Father         Polio left hand, childhood onset    Alcohol abuse Mother     Bipolar Disorder Mother     Bipolar Disorder Brother     Attention Deficit Disorder Brother     Congenital heart defect Paternal Aunt     Alcohol abuse Brother     Bipolar Disorder Brother     Suicide Brother     Congenital heart defect Son     Attention Deficit Hyperactivity Disorder Son        Social History     Socioeconomic History    Marital status: SINGLE     Spouse name: Not on file    Number of children: Not on file    Years of education: Not on file    Highest education level: Not on file   Occupational History    Not on file   Social Needs    Financial resource strain: Not on file    Food insecurity: Worry: Not on file     Inability: Not on file    Transportation needs:     Medical: Not on file     Non-medical: Not on file   Tobacco Use    Smoking status: Former Smoker     Types: Cigarettes     Start date: 3/17/1968     Last attempt to quit: 3/17/1999     Years since quittin.7    Smokeless tobacco: Never Used   Substance and Sexual Activity    Alcohol use: Yes     Alcohol/week: 0.8 standard drinks     Types: 1 Shots of liquor per week     Comment: 1-2 shots of liqueur/month    Drug use: No    Sexual activity: Never   Lifestyle    Physical activity:     Days per week: Not on file     Minutes per session: Not on file    Stress: Not on file   Relationships    Social connections:     Talks on phone: Not on file     Gets together: Not on file     Attends Confucianist service: Not on file     Active member of club or organization: Not on file     Attends meetings of clubs or organizations: Not on file     Relationship status: Not on file    Intimate partner violence:     Fear of current or ex partner: Not on file     Emotionally abused: Not on file     Physically abused: Not on file     Forced sexual activity: Not on file   Other Topics Concern    Not on file   Social History Narrative    Not on file         ALLERGIES: Codeine    Review of Systems   Constitutional: Negative for fever. HENT: Negative for sore throat and trouble swallowing. Eyes: Negative for visual disturbance. Respiratory: Negative for cough. Cardiovascular: Positive for chest pain. Gastrointestinal: Negative for abdominal pain. Genitourinary: Negative for difficulty urinating. Musculoskeletal: Negative for gait problem. Skin: Negative for rash. Neurological: Negative for syncope. Psychiatric/Behavioral: Positive for sleep disturbance.        Vitals:    19 1929 19 2150   BP: (!) 139/95    Pulse: (!) 116 97   Resp: 18    Temp: 98.5 °F (36.9 °C)    SpO2: 96% 97%   Weight: 107 kg (236 lb)    Height: 5' 3\" (1.6 m)             Physical Exam  Vitals signs and nursing note reviewed. Constitutional:       General: She is in acute distress. Appearance: She is well-developed. She is obese. She is not ill-appearing, toxic-appearing or diaphoretic. HENT:      Head: Normocephalic and atraumatic. Right Ear: External ear normal.      Left Ear: External ear normal.      Nose: Nose normal.      Mouth/Throat:      Pharynx: Uvula midline. Eyes:      General: No scleral icterus. Conjunctiva/sclera: Conjunctivae normal.   Neck:      Musculoskeletal: Neck supple. Cardiovascular:      Rate and Rhythm: Regular rhythm. Tachycardia present. Heart sounds: Normal heart sounds. Pulmonary:      Effort: Pulmonary effort is normal.      Breath sounds: Normal breath sounds. Chest:      Chest wall: Tenderness present. Abdominal:      Palpations: Abdomen is soft. Tenderness: There is no tenderness. Musculoskeletal:      Right lower leg: No edema. Left lower leg: No edema. Skin:     General: Skin is warm and dry. Neurological:      General: No focal deficit present. Mental Status: She is alert and oriented to person, place, and time.       Gait: Gait normal.   Psychiatric:         Behavior: Behavior normal.          MDM       Procedures    Vitals:  Patient Vitals for the past 12 hrs:   Temp Pulse Resp BP SpO2   12/23/19 2150  97   97 %   12/23/19 1929 98.5 °F (36.9 °C) (!) 116 18 (!) 139/95 96 %         Medications ordered:   Medications   sodium chloride 0.9 % bolus infusion 1,000 mL (1,000 mL IntraVENous New Bag 12/23/19 2143)   lidocaine 4 % patch 1 Patch (has no administration in time range)   ketorolac (TORADOL) injection 15 mg (15 mg IntraVENous Given 12/23/19 2144)   acetaminophen (TYLENOL) tablet 1,000 mg (1,000 mg Oral Given 12/23/19 2145)   diazePAM (VALIUM) tablet 5 mg (5 mg Oral Given 12/23/19 2145)         Lab findings:  Recent Results (from the past 12 hour(s))   CBC WITH AUTOMATED DIFF    Collection Time: 12/23/19  7:45 PM   Result Value Ref Range    WBC 8.3 4.6 - 13.2 K/uL    RBC 4.69 4.20 - 5.30 M/uL    HGB 13.6 12.0 - 16.0 g/dL    HCT 41.6 35.0 - 45.0 %    MCV 88.7 74.0 - 97.0 FL    MCH 29.0 24.0 - 34.0 PG    MCHC 32.7 31.0 - 37.0 g/dL    RDW 14.0 11.6 - 14.5 %    PLATELET 493 978 - 259 K/uL    MPV 10.3 9.2 - 11.8 FL    NEUTROPHILS 59 40 - 73 %    LYMPHOCYTES 31 21 - 52 %    MONOCYTES 7 3 - 10 %    EOSINOPHILS 2 0 - 5 %    BASOPHILS 1 0 - 2 %    ABS. NEUTROPHILS 4.9 1.8 - 8.0 K/UL    ABS. LYMPHOCYTES 2.5 0.9 - 3.6 K/UL    ABS. MONOCYTES 0.6 0.05 - 1.2 K/UL    ABS. EOSINOPHILS 0.2 0.0 - 0.4 K/UL    ABS. BASOPHILS 0.0 0.0 - 0.1 K/UL    DF AUTOMATED     METABOLIC PANEL, COMPREHENSIVE    Collection Time: 12/23/19  7:45 PM   Result Value Ref Range    Sodium 139 136 - 145 mmol/L    Potassium 3.8 3.5 - 5.5 mmol/L    Chloride 107 100 - 111 mmol/L    CO2 25 21 - 32 mmol/L    Anion gap 7 3.0 - 18 mmol/L    Glucose 117 (H) 74 - 99 mg/dL    BUN 20 (H) 7.0 - 18 MG/DL    Creatinine 0.98 0.6 - 1.3 MG/DL    BUN/Creatinine ratio 20 12 - 20      GFR est AA >60 >60 ml/min/1.73m2    GFR est non-AA 57 (L) >60 ml/min/1.73m2    Calcium 8.7 8.5 - 10.1 MG/DL    Bilirubin, total 0.3 0.2 - 1.0 MG/DL    ALT (SGPT) 27 13 - 56 U/L    AST (SGOT) 26 10 - 38 U/L    Alk. phosphatase 108 45 - 117 U/L    Protein, total 7.3 6.4 - 8.2 g/dL    Albumin 3.8 3.4 - 5.0 g/dL    Globulin 3.5 2.0 - 4.0 g/dL    A-G Ratio 1.1 0.8 - 1.7     TROPONIN I    Collection Time: 12/23/19  7:45 PM   Result Value Ref Range    Troponin-I, QT <0.02 0.0 - 0.045 NG/ML       EKG interpretation by ED Physician:  Sinus tachycardia with nonspecific T wave abnormality. There is no acute ST changes. Rate 120 QRS 74   No previous EKG    Cardiac monitor: Tachycardia with regular rhythm no ectopy  Pulse ox: 96% room air    X-Ray, CT or other radiology findings or impressions:  XR CHEST PA LAT    (Results Pending)   Poor inspiratory effort. No obvious consolidation or mass. Progress notes, Consult notes or additional Procedure notes:   Patient with reproducible pain and mechanism to go along with her symptoms. Patient heart rate is normalized here. Most consistent with musculoskeletal strain. Do not feel patient requires other work-up or imaging at this time. I have discussed with patient and/or family/sig other the results, interpretation of any imaging if performed, suspected diagnosis and treatment plan to include instructions regarding the diagnoses listed to which understanding was expressed with all questions answered      Reevaluation of patient:   stable    Disposition:  Diagnosis:   1. Chest wall pain        Disposition: home    Follow-up Information     Follow up With Specialties Details Why Contact Info    Marck Michaels MD Family Practice Schedule an appointment as soon as possible for a visit  [address]  [address] [facility] EMERGENCY DEPT Emergency Medicine  If symptoms worsen [address]  [phone]            Patient's Medications   Start Taking    ACETAMINOPHEN (TYLENOL EXTRA STRENGTH) 500 MG TABLET    Take 2 Tabs by mouth every six (6) hours as needed for Pain. IBUPROFEN (MOTRIN) 600 MG TABLET    Take 1 Tab by mouth every six (6) hours as needed for Pain. LIDOCAINE (LIDODERM) 5 %    Apply patch to the affected area for 12 hours a day and remove for 12 hours a day. Continue Taking    ASPIRIN DELAYED-RELEASE 81 MG TABLET    Take 81 mg by mouth daily. CALCIUM CARBONATE-VITAMIN D3 600 MG (1,500 MG)-800 UNIT CHEW    Take 1 Tab by mouth daily. CO-ENZYME Q-10 (CO Q-10) 100 MG CAPSULE    Take 100 mg by mouth daily. GABAPENTIN (NEURONTIN) 300 MG CAPSULE    Take 300 mg by mouth. Patient states she takes 2 capsules in the morning and 3 capsules at night.     LOVASTATIN (MEVACOR) 40 MG TABLET    TAKE ONE-HALF TABLET BY MOUTH AT BEDTIME METHYLPHENIDATE HCL (RITALIN) 10 MG TABLET    Take 15 mg by mouth daily. MULTIVITAMIN (ONE A DAY) TABLET    Take 1 Tab by mouth daily. QUETIAPINE FUMARATE (SEROQUEL XR PO)    Take 100 mg by mouth. ZIPRASIDONE (GEODON) 80 MG CAPSULE    Take 80 mg by mouth two (2) times daily (with meals). These Medications have changed    No medications on file   Stop Taking    ACETAMINOPHEN (TYLENOL) 325 MG TABLET    Take 650 mg by mouth every eight (8) hours as needed for Pain.

## 2019-12-24 NOTE — ED NOTES
10:55 PM  12/23/19     Discharge instructions given to patient (name) with verbalization of understanding. Patient accompanied by family. Patient discharged with the following prescriptions Ibuprofen, Lidocaine patch. Patient discharged to home (destination).       Emile Shane RN

## 2020-01-23 RX ORDER — LOVASTATIN 40 MG/1
TABLET ORAL
Qty: 45 TAB | Refills: 0 | Status: SHIPPED | OUTPATIENT
Start: 2020-01-23 | End: 2020-05-09

## 2020-05-09 RX ORDER — LOVASTATIN 40 MG/1
TABLET ORAL
Qty: 45 TAB | Refills: 0 | Status: SHIPPED | OUTPATIENT
Start: 2020-05-09 | End: 2020-08-13

## 2020-08-13 RX ORDER — LOVASTATIN 40 MG/1
TABLET ORAL
Qty: 45 TAB | Refills: 0 | Status: SHIPPED | OUTPATIENT
Start: 2020-08-13 | End: 2020-09-23 | Stop reason: SDUPTHER

## 2020-09-23 ENCOUNTER — OFFICE VISIT (OUTPATIENT)
Dept: CARDIOLOGY CLINIC | Age: 65
End: 2020-09-23
Payer: MEDICARE

## 2020-09-23 ENCOUNTER — HOSPITAL ENCOUNTER (OUTPATIENT)
Dept: LAB | Age: 65
Discharge: HOME | End: 2020-09-23
Payer: MEDICARE

## 2020-09-23 VITALS
OXYGEN SATURATION: 97 % | HEIGHT: 63 IN | BODY MASS INDEX: 41.46 KG/M2 | SYSTOLIC BLOOD PRESSURE: 117 MMHG | TEMPERATURE: 98.2 F | DIASTOLIC BLOOD PRESSURE: 70 MMHG | WEIGHT: 234 LBS | HEART RATE: 97 BPM

## 2020-09-23 DIAGNOSIS — F31.9 BIPOLAR 1 DISORDER (HCC): ICD-10-CM

## 2020-09-23 DIAGNOSIS — E78.5 DYSLIPIDEMIA: ICD-10-CM

## 2020-09-23 DIAGNOSIS — E66.01 SEVERE OBESITY WITH BODY MASS INDEX (BMI) OF 35.0 TO 39.9 WITH SERIOUS COMORBIDITY (HCC): Primary | ICD-10-CM

## 2020-09-23 DIAGNOSIS — Z82.49 FAMILY HISTORY OF PREMATURE CAD: ICD-10-CM

## 2020-09-23 DIAGNOSIS — E66.01 SEVERE OBESITY WITH BODY MASS INDEX (BMI) OF 35.0 TO 39.9 WITH SERIOUS COMORBIDITY (HCC): ICD-10-CM

## 2020-09-23 LAB
ALBUMIN SERPL-MCNC: 3.8 G/DL (ref 3.4–5)
ALBUMIN/GLOB SERPL: 1.1 {RATIO} (ref 0.8–1.7)
ALP SERPL-CCNC: 110 U/L (ref 45–117)
ALT SERPL-CCNC: 24 U/L (ref 13–56)
ANION GAP SERPL CALC-SCNC: 5 MMOL/L (ref 3–18)
AST SERPL-CCNC: 19 U/L (ref 10–38)
BASOPHILS # BLD: 0 K/UL (ref 0–0.1)
BASOPHILS NFR BLD: 0 % (ref 0–2)
BILIRUB SERPL-MCNC: 0.4 MG/DL (ref 0.2–1)
BUN SERPL-MCNC: 11 MG/DL (ref 7–18)
BUN/CREAT SERPL: 13 (ref 12–20)
CALCIUM SERPL-MCNC: 9.8 MG/DL (ref 8.5–10.1)
CHLORIDE SERPL-SCNC: 105 MMOL/L (ref 100–111)
CHOLEST SERPL-MCNC: 152 MG/DL
CO2 SERPL-SCNC: 30 MMOL/L (ref 21–32)
CREAT SERPL-MCNC: 0.84 MG/DL (ref 0.6–1.3)
DIFFERENTIAL METHOD BLD: NORMAL
EOSINOPHIL # BLD: 0.2 K/UL (ref 0–0.4)
EOSINOPHIL NFR BLD: 2 % (ref 0–5)
ERYTHROCYTE [DISTWIDTH] IN BLOOD BY AUTOMATED COUNT: 13.6 % (ref 11.6–14.5)
GLOBULIN SER CALC-MCNC: 3.6 G/DL (ref 2–4)
GLUCOSE SERPL-MCNC: 88 MG/DL (ref 74–99)
HCT VFR BLD AUTO: 41.6 % (ref 35–45)
HDLC SERPL-MCNC: 60 MG/DL (ref 40–60)
HDLC SERPL: 2.5 {RATIO} (ref 0–5)
HGB BLD-MCNC: 13.7 G/DL (ref 12–16)
LDLC SERPL CALC-MCNC: 68 MG/DL (ref 0–100)
LIPID PROFILE,FLP: NORMAL
LYMPHOCYTES # BLD: 2.6 K/UL (ref 0.9–3.6)
LYMPHOCYTES NFR BLD: 38 % (ref 21–52)
MCH RBC QN AUTO: 28.5 PG (ref 24–34)
MCHC RBC AUTO-ENTMCNC: 32.9 G/DL (ref 31–37)
MCV RBC AUTO: 86.7 FL (ref 74–97)
MONOCYTES # BLD: 0.5 K/UL (ref 0.05–1.2)
MONOCYTES NFR BLD: 7 % (ref 3–10)
NEUTS SEG # BLD: 3.7 K/UL (ref 1.8–8)
NEUTS SEG NFR BLD: 53 % (ref 40–73)
PLATELET # BLD AUTO: 257 K/UL (ref 135–420)
PMV BLD AUTO: 11.1 FL (ref 9.2–11.8)
POTASSIUM SERPL-SCNC: 4.6 MMOL/L (ref 3.5–5.5)
PROT SERPL-MCNC: 7.4 G/DL (ref 6.4–8.2)
RBC # BLD AUTO: 4.8 M/UL (ref 4.2–5.3)
SODIUM SERPL-SCNC: 140 MMOL/L (ref 136–145)
T4 SERPL-MCNC: 9.7 UG/DL (ref 4.8–13.9)
TRIGL SERPL-MCNC: 120 MG/DL (ref ?–150)
TSH SERPL DL<=0.05 MIU/L-ACNC: 2.2 UIU/ML (ref 0.36–3.74)
VLDLC SERPL CALC-MCNC: 24 MG/DL
WBC # BLD AUTO: 6.9 K/UL (ref 4.6–13.2)

## 2020-09-23 PROCEDURE — G8427 DOCREV CUR MEDS BY ELIG CLIN: HCPCS | Performed by: INTERNAL MEDICINE

## 2020-09-23 PROCEDURE — 1090F PRES/ABSN URINE INCON ASSESS: CPT | Performed by: INTERNAL MEDICINE

## 2020-09-23 PROCEDURE — 99214 OFFICE O/P EST MOD 30 MIN: CPT | Performed by: INTERNAL MEDICINE

## 2020-09-23 PROCEDURE — G8536 NO DOC ELDER MAL SCRN: HCPCS | Performed by: INTERNAL MEDICINE

## 2020-09-23 PROCEDURE — 84443 ASSAY THYROID STIM HORMONE: CPT

## 2020-09-23 PROCEDURE — G9717 DOC PT DX DEP/BP F/U NT REQ: HCPCS | Performed by: INTERNAL MEDICINE

## 2020-09-23 PROCEDURE — 80053 COMPREHEN METABOLIC PANEL: CPT

## 2020-09-23 PROCEDURE — 84436 ASSAY OF TOTAL THYROXINE: CPT

## 2020-09-23 PROCEDURE — 85025 COMPLETE CBC W/AUTO DIFF WBC: CPT

## 2020-09-23 PROCEDURE — 3017F COLORECTAL CA SCREEN DOC REV: CPT | Performed by: INTERNAL MEDICINE

## 2020-09-23 PROCEDURE — G8399 PT W/DXA RESULTS DOCUMENT: HCPCS | Performed by: INTERNAL MEDICINE

## 2020-09-23 PROCEDURE — 36415 COLL VENOUS BLD VENIPUNCTURE: CPT

## 2020-09-23 PROCEDURE — G8417 CALC BMI ABV UP PARAM F/U: HCPCS | Performed by: INTERNAL MEDICINE

## 2020-09-23 PROCEDURE — 80061 LIPID PANEL: CPT

## 2020-09-23 PROCEDURE — 1101F PT FALLS ASSESS-DOCD LE1/YR: CPT | Performed by: INTERNAL MEDICINE

## 2020-09-23 RX ORDER — LOVASTATIN 40 MG/1
TABLET ORAL
Qty: 45 TAB | Refills: 3 | Status: SHIPPED | OUTPATIENT
Start: 2020-09-23 | End: 2021-10-09

## 2020-09-23 NOTE — PROGRESS NOTES
Roberta Everett presents today for   Chief Complaint   Patient presents with    Follow-up       Roberta Everett preferred language for health care discussion is english/other. Is someone accompanying this pt?no    Is the patient using any DME equipment during OV? no    Depression Screening:  3 most recent PHQ Screens 3/18/2019   PHQ Not Done -   Little interest or pleasure in doing things Not at all   Feeling down, depressed, irritable, or hopeless Not at all   Total Score PHQ 2 0       Learning Assessment:  Learning Assessment 5/9/2017   PRIMARY LEARNER Patient   HIGHEST LEVEL OF EDUCATION - PRIMARY LEARNER  GRADUATED HIGH SCHOOL OR GED   BARRIERS PRIMARY LEARNER NONE   CO-LEARNER CAREGIVER No   PRIMARY LANGUAGE ENGLISH   LEARNER PREFERENCE PRIMARY DEMONSTRATION     -   ANSWERED BY patient   RELATIONSHIP SELF       Abuse Screening:  Abuse Screening Questionnaire 9/27/2017   Do you ever feel afraid of your partner? N   Are you in a relationship with someone who physically or mentally threatens you? N   Is it safe for you to go home? Y       Fall Risk  Fall Risk Assessment, last 12 mths 9/23/2020   Able to walk? Yes   Fall in past 12 months? No       Pt currently taking Anticoagulant therapy? Coordination of Care:  1. Have you been to the ER, urgent care clinic since your last visit? Hospitalized since your last visit? no    2. Have you seen or consulted any other health care providers outside of the [address] [place] [address] since your last visit? Include any pap smears or colon screening.  no

## 2020-09-23 NOTE — PATIENT INSTRUCTIONS
Labs 
 
CBC, CMP, Lipids, TSH, T4 No appointment required for lab work Hours are Mon-Fri 7:00 am-5:00 pm 
All labs are completed at: 
[facility] Alan [address] [place] [address] \

## 2020-09-26 NOTE — PROGRESS NOTES
Subjective:      Israel Rey is in the office today for cardiac reevaluation. She is a 54-year-old woman with a history of dyslipidemia and a family history of premature coronary disease. She was last seen in this office in December 2018. At that time, she was doing well without any particular complaints. She continues to be on lovastatin for her elevated cholesterol in the past.                 In the office today, she reports that she is feeling fine. Sydnee Katelynn Her breathing has been good. She has had no chest pain. She has had no palpitations or peripheral swelling. She has been trying to exercise more frequently. She reports that her blood pressure has been excellent. Patient Active Problem List    Diagnosis Date Noted    Bipolar 1 disorder ([facility] [facility] 03/18/2019    Severe obesity with body mass index (BMI) of 35.0 to 39.9 with serious comorbidity ([facility] 06/07/2018    Advanced care planning/counseling discussion 06/07/2018    Family history of premature CAD 11/12/2017    Dyslipidemia      Current Outpatient Medications   Medication Sig Dispense Refill    ibuprofen (MOTRIN) 600 mg tablet Take 1 Tab by mouth every six (6) hours as needed for Pain. 20 Tab 0    acetaminophen (TYLENOL EXTRA STRENGTH) 500 mg tablet Take 2 Tabs by mouth every six (6) hours as needed for Pain. 50 Tab 0    lidocaine (LIDODERM) 5 % Apply patch to the affected area for 12 hours a day and remove for 12 hours a day. 1 Package 1    quetiapine fumarate (SEROQUEL XR PO) Take 100 mg by mouth.  co-enzyme Q-10 (CO Q-10) 100 mg capsule Take 100 mg by mouth daily.  calcium carbonate-vitamin D3 600 mg (1,500 mg)-800 unit chew Take 1 Tab by mouth daily.  gabapentin (NEURONTIN) 300 mg capsule Take 300 mg by mouth. Patient states she takes 2 capsules in the morning and 3 capsules at night.  aspirin delayed-release 81 mg tablet Take 81 mg by mouth daily.       multivitamin (ONE A DAY) tablet Take 1 Tab by mouth daily.  ziprasidone (GEODON) 80 mg capsule Take 80 mg by mouth two (2) times daily (with meals).       lovastatin (MEVACOR) 40 mg tablet TAKE 0.5 TABLET BY MOUTH EVERY NIGHT AT BEDTIME 45 Tab 3     Allergies   Allergen Reactions    Codeine Other (comments)     Pass out     Past Medical History:   Diagnosis Date    Attention deficit hyperactivity disorder     Bipolar disorder     Depression     nos    Dyslipidemia     Fetal alcohol syndrome      Past Surgical History:   Procedure Laterality Date    HX COLONOSCOPY  2015    Repeat colonoscopy in 10 yrs per Dr. Shruthi Carpio        varicose vein     Family History   Problem Relation Age of Onset    Heart Attack Father     Other Father         Polio left hand, childhood onset    Alcohol abuse Mother     Bipolar Disorder Mother     Bipolar Disorder Brother     Attention Deficit Disorder Brother     Congenital heart defect Paternal Aunt     Alcohol abuse Brother     Bipolar Disorder Brother     Suicide Brother     Congenital heart defect Son     Attention Deficit Hyperactivity Disorder Son      Social History     Tobacco Use   Smoking Status Former Smoker    Types: Cigarettes    Start date: 3/17/1968    Last attempt to quit: 3/17/1999    Years since quittin.5   Smokeless Tobacco Never Used          Review of Systems, additional:  Constitutional: negative  Eyes: negative  Respiratory: negative  Cardiovascular: negative  Gastrointestinal: negative  Musculoskeletal:negative  Neurological: negative  Behvioral/Psych: negative  Endocrine: negative  ENT: negative    Objective:     Visit Vitals  /70   Pulse 97   Temp 98.2 °F (36.8 °C) (Temporal)   Ht 5' 3\" (1.6 m)   Wt 234 lb (106.1 kg)   SpO2 97%   BMI 41.45 kg/m²     General:  alert, cooperative, no distress   Chest Wall: inspection normal - no chest wall deformities or tenderness, respiratory effort normal Lung: clear to auscultation bilaterally   Heart:  normal rate and regular rhythm, S1 and S2 normal, no murmurs noted, no gallops noted   Abdomen: soft, non-tender. Bowel sounds normal. No masses,  no organomegaly   Extremities: extremities normal, atraumatic, no cyanosis or edema Skin: no rashes   Neuro: alert, oriented, normal speech, no focal findings or movement disorder noted         Assessment/Plan:       ICD-10-CM ICD-9-CM    1. Dyslipidemia, Lipid profile done 06/2018; CHOL 158, HDL 60, LDL 1 2, . Continue lovastatin. Repeat lipid profile. .  Will also order CMP, CBC, T4 and TSH. Return in 1 year E78.5 272.4    2. Family history of premature CAD, no symptoms suggestive of ischemic heart disease at present.    Z82.49 V17.3

## 2021-10-09 RX ORDER — LOVASTATIN 40 MG/1
TABLET ORAL
Qty: 45 TABLET | Refills: 3 | Status: SHIPPED | OUTPATIENT
Start: 2021-10-09 | End: 2022-10-15

## 2021-11-27 NOTE — PROGRESS NOTES
Called patient to inform her PA Denise reviewed her lab results and it indicated her urine culture shows growth of E. Coli that is susceptible to nitrofurantoin she was given. Patient stated she feels fine now and her symptoms have cleared up. Patient verbalized understanding and had no further questions. Pt arrives from nursing home with c/o unable to get suprapubic cath out to change it, unable to get cuff to deflate

## 2022-03-19 PROBLEM — E66.01 SEVERE OBESITY WITH BODY MASS INDEX (BMI) OF 35.0 TO 39.9 WITH SERIOUS COMORBIDITY (HCC): Status: ACTIVE | Noted: 2018-06-07

## 2022-03-19 PROBLEM — F31.9 BIPOLAR 1 DISORDER (HCC): Status: ACTIVE | Noted: 2019-03-18

## 2022-03-20 PROBLEM — Z82.49 FAMILY HISTORY OF PREMATURE CAD: Status: ACTIVE | Noted: 2017-11-12

## 2022-03-20 PROBLEM — Z71.89 ADVANCED CARE PLANNING/COUNSELING DISCUSSION: Status: ACTIVE | Noted: 2018-06-07

## 2022-06-03 ENCOUNTER — HOSPITAL ENCOUNTER (OUTPATIENT)
Dept: LAB | Age: 67
Discharge: HOME | End: 2022-06-03
Payer: MEDICARE

## 2022-06-03 ENCOUNTER — OFFICE VISIT (OUTPATIENT)
Dept: CARDIOLOGY CLINIC | Age: 67
End: 2022-06-03
Payer: MEDICARE

## 2022-06-03 VITALS
DIASTOLIC BLOOD PRESSURE: 82 MMHG | TEMPERATURE: 97.3 F | SYSTOLIC BLOOD PRESSURE: 128 MMHG | HEART RATE: 91 BPM | BODY MASS INDEX: 41.95 KG/M2 | WEIGHT: 236.8 LBS | OXYGEN SATURATION: 96 %

## 2022-06-03 DIAGNOSIS — Z82.49 FAMILY HISTORY OF PREMATURE CAD: Primary | ICD-10-CM

## 2022-06-03 DIAGNOSIS — E78.5 DYSLIPIDEMIA: ICD-10-CM

## 2022-06-03 DIAGNOSIS — Z82.49 FAMILY HISTORY OF PREMATURE CAD: ICD-10-CM

## 2022-06-03 LAB
ALBUMIN SERPL-MCNC: 4 G/DL (ref 3.4–5)
ALBUMIN/GLOB SERPL: 1.1 {RATIO} (ref 0.8–1.7)
ALP SERPL-CCNC: 114 U/L (ref 45–117)
ALT SERPL-CCNC: 25 U/L (ref 13–56)
ANION GAP SERPL CALC-SCNC: 6 MMOL/L (ref 3–18)
AST SERPL-CCNC: 17 U/L (ref 10–38)
BASOPHILS # BLD: 0.1 K/UL (ref 0–0.1)
BASOPHILS NFR BLD: 1 % (ref 0–2)
BILIRUB SERPL-MCNC: 0.4 MG/DL (ref 0.2–1)
BUN SERPL-MCNC: 11 MG/DL (ref 7–18)
BUN/CREAT SERPL: 12 (ref 12–20)
CALCIUM SERPL-MCNC: 9.8 MG/DL (ref 8.5–10.1)
CHLORIDE SERPL-SCNC: 105 MMOL/L (ref 100–111)
CO2 SERPL-SCNC: 30 MMOL/L (ref 21–32)
CREAT SERPL-MCNC: 0.89 MG/DL (ref 0.6–1.3)
DIFFERENTIAL METHOD BLD: NORMAL
EOSINOPHIL # BLD: 0.2 K/UL (ref 0–0.4)
EOSINOPHIL NFR BLD: 3 % (ref 0–5)
ERYTHROCYTE [DISTWIDTH] IN BLOOD BY AUTOMATED COUNT: 14.2 % (ref 11.6–14.5)
GLOBULIN SER CALC-MCNC: 3.5 G/DL (ref 2–4)
GLUCOSE SERPL-MCNC: 97 MG/DL (ref 74–99)
HCT VFR BLD AUTO: 43.3 % (ref 35–45)
HGB BLD-MCNC: 13.7 G/DL (ref 12–16)
IMM GRANULOCYTES # BLD AUTO: 0 K/UL (ref 0–0.04)
IMM GRANULOCYTES NFR BLD AUTO: 0 % (ref 0–0.5)
LYMPHOCYTES # BLD: 2.7 K/UL (ref 0.9–3.6)
LYMPHOCYTES NFR BLD: 44 % (ref 21–52)
MCH RBC QN AUTO: 27.5 PG (ref 24–34)
MCHC RBC AUTO-ENTMCNC: 31.6 G/DL (ref 31–37)
MCV RBC AUTO: 86.9 FL (ref 78–100)
MONOCYTES # BLD: 0.6 K/UL (ref 0.05–1.2)
MONOCYTES NFR BLD: 9 % (ref 3–10)
NEUTS SEG # BLD: 2.6 K/UL (ref 1.8–8)
NEUTS SEG NFR BLD: 42 % (ref 40–73)
NRBC # BLD: 0 K/UL (ref 0–0.01)
NRBC BLD-RTO: 0 PER 100 WBC
PLATELET # BLD AUTO: 263 K/UL (ref 135–420)
PMV BLD AUTO: 11.3 FL (ref 9.2–11.8)
POTASSIUM SERPL-SCNC: 4.5 MMOL/L (ref 3.5–5.5)
PROT SERPL-MCNC: 7.5 G/DL (ref 6.4–8.2)
RBC # BLD AUTO: 4.98 M/UL (ref 4.2–5.3)
SODIUM SERPL-SCNC: 141 MMOL/L (ref 136–145)
T4 FREE SERPL-MCNC: 1 NG/DL (ref 0.7–1.5)
TSH SERPL DL<=0.05 MIU/L-ACNC: 1.89 UIU/ML (ref 0.36–3.74)
WBC # BLD AUTO: 6.1 K/UL (ref 4.6–13.2)

## 2022-06-03 PROCEDURE — 85025 COMPLETE CBC W/AUTO DIFF WBC: CPT

## 2022-06-03 PROCEDURE — G8399 PT W/DXA RESULTS DOCUMENT: HCPCS | Performed by: INTERNAL MEDICINE

## 2022-06-03 PROCEDURE — 36415 COLL VENOUS BLD VENIPUNCTURE: CPT

## 2022-06-03 PROCEDURE — 99214 OFFICE O/P EST MOD 30 MIN: CPT | Performed by: INTERNAL MEDICINE

## 2022-06-03 PROCEDURE — G8417 CALC BMI ABV UP PARAM F/U: HCPCS | Performed by: INTERNAL MEDICINE

## 2022-06-03 PROCEDURE — 93000 ELECTROCARDIOGRAM COMPLETE: CPT | Performed by: INTERNAL MEDICINE

## 2022-06-03 PROCEDURE — 84439 ASSAY OF FREE THYROXINE: CPT

## 2022-06-03 PROCEDURE — G8427 DOCREV CUR MEDS BY ELIG CLIN: HCPCS | Performed by: INTERNAL MEDICINE

## 2022-06-03 PROCEDURE — 80053 COMPREHEN METABOLIC PANEL: CPT

## 2022-06-03 PROCEDURE — 1101F PT FALLS ASSESS-DOCD LE1/YR: CPT | Performed by: INTERNAL MEDICINE

## 2022-06-03 PROCEDURE — G8536 NO DOC ELDER MAL SCRN: HCPCS | Performed by: INTERNAL MEDICINE

## 2022-06-03 PROCEDURE — G9717 DOC PT DX DEP/BP F/U NT REQ: HCPCS | Performed by: INTERNAL MEDICINE

## 2022-06-03 PROCEDURE — 1123F ACP DISCUSS/DSCN MKR DOCD: CPT | Performed by: INTERNAL MEDICINE

## 2022-06-03 PROCEDURE — 3017F COLORECTAL CA SCREEN DOC REV: CPT | Performed by: INTERNAL MEDICINE

## 2022-06-03 PROCEDURE — 1090F PRES/ABSN URINE INCON ASSESS: CPT | Performed by: INTERNAL MEDICINE

## 2022-06-03 NOTE — PROGRESS NOTES
Identified pt with two pt identifiers(name and ). Reviewed record in preparation for visit and have obtained necessary documentation. No Han presents today for No chief complaint on file. Pt denies DIZZINESS, SOB, CHEST PAIN/ PRESSURE, FATIGUE/WEAKNESS, HEADACHES, SWELLING. No Han preferred language for health care discussion is english/other. Personal Protective Equipment:   Personal Protective Equipment was used including: mask-surgical and hands-gloves. Patient was placed on no precaution(s). Patient was masked. Precautions:   Patient currently on None  Patient currently roomed with door closed. Is someone accompanying this pt? No     Is the patient using any DME equipment during OV? No     Depression Screening:  3 most recent PHQ Screens 3/18/2019   PHQ Not Done -   Little interest or pleasure in doing things Not at all   Feeling down, depressed, irritable, or hopeless Not at all   Total Score PHQ 2 0       Learning Assessment:  Learning Assessment 2017   PRIMARY LEARNER Patient   HIGHEST LEVEL OF EDUCATION - PRIMARY LEARNER  GRADUATED HIGH SCHOOL OR GED   BARRIERS PRIMARY LEARNER NONE   CO-LEARNER CAREGIVER No   PRIMARY LANGUAGE ENGLISH   LEARNER PREFERENCE PRIMARY DEMONSTRATION     -   ANSWERED BY patient   RELATIONSHIP SELF       Abuse Screening:  Abuse Screening Questionnaire 2017   Do you ever feel afraid of your partner? N   Are you in a relationship with someone who physically or mentally threatens you? N   Is it safe for you to go home? Y       Fall Risk  Fall Risk Assessment, last 12 mths 2020   Able to walk? Yes   Fall in past 12 months? No       Pt currently taking Anticoagulant therapy? No   Pt currently taking Antiplatelet therapy? yes    Coordination of Care:  1. Have you been to the ER, urgent care clinic since your last visit? Hospitalized since your last visit? No     2.  Have you seen or consulted any other health care providers outside of the [address] [place] [address] since your last visit? Include any pap smears or colon screening. Yes. Please see Red banners under Allergies and Med Rec to remove outside inquires. All correct information has been verified with patient and added to chart.      Medication's patient's would liked removed has been marked not taking to be removed per Verbal order and read back per Evangelist Lara MD

## 2022-06-03 NOTE — PATIENT INSTRUCTIONS
Cbc  Cmp  T4 and Tsh    Labs drawn in [facility] [address] Their hours of operation are Monday through Friday 7am-3:30 pm.  If you have and questions regarding directions please contact them at [phone]. ** Closed from 12:15pm -1 pm for lunch.

## 2022-06-07 NOTE — PROGRESS NOTES
Subjective:      Christiana Kruger is in the office today for cardiac reevaluation. She is a 31-year-old woman with a history of dyslipidemia and a family history of premature coronary disease. She was last seen in this office in September 2020. At that time, she was doing well without any particular complaints. She continues to be on lovastatin for her elevated cholesterol. In the office today, she reports that her blood pressure has been good. She has had no chest pain. She only experiences shortness of breath if she walks at a rapid pace. She has had no peripheral swelling. Patient Active Problem List    Diagnosis Date Noted    Bipolar 1 disorder ([facility] [address] [facility] 03/18/2019    Severe obesity with body mass index (BMI) of 35.0 to 39.9 with serious comorbidity ([facility] 06/07/2018    Advanced care planning/counseling discussion 06/07/2018    Family history of premature CAD 11/12/2017    Dyslipidemia      Current Outpatient Medications   Medication Sig Dispense Refill    lovastatin (MEVACOR) 40 mg tablet TAKE 1/2 TABLET BY MOUTH EVERY NIGHT AT BEDTIME 45 Tablet 3    ibuprofen (MOTRIN) 600 mg tablet Take 1 Tab by mouth every six (6) hours as needed for Pain. 20 Tab 0    acetaminophen (TYLENOL EXTRA STRENGTH) 500 mg tablet Take 2 Tabs by mouth every six (6) hours as needed for Pain. 50 Tab 0    quetiapine fumarate (SEROQUEL XR PO) Take 100 mg by mouth.  co-enzyme Q-10 (CO Q-10) 100 mg capsule Take 100 mg by mouth daily.  calcium carbonate-vitamin D3 600 mg (1,500 mg)-800 unit chew Take 1 Tab by mouth daily.  gabapentin (NEURONTIN) 300 mg capsule Take 300 mg by mouth. Patient states she takes 2 capsules in the morning and 3 capsules at night.  aspirin delayed-release 81 mg tablet Take 81 mg by mouth daily.  multivitamin (ONE A DAY) tablet Take 1 Tab by mouth daily.  ziprasidone (GEODON) 80 mg capsule Take 80 mg by mouth two (2) times daily (with meals). Allergies   Allergen Reactions    Tetracycline Itching    Codeine Other (comments)     Pass out     Past Medical History:   Diagnosis Date    Attention deficit hyperactivity disorder     Bipolar disorder     Depression     nos    Dyslipidemia     Fetal alcohol syndrome      Past Surgical History:   Procedure Laterality Date    HX COLONOSCOPY  2015    Repeat colonoscopy in 10 yrs per Dr. Kaden Lorenzana        varicose vein     Family History   Problem Relation Age of Onset    Heart Attack Father     Other Father         Polio left hand, childhood onset    Alcohol abuse Mother     Bipolar Disorder Mother     Bipolar Disorder Brother     Attention Deficit Disorder Brother     Congenital heart defect Paternal Aunt     Alcohol abuse Brother     Bipolar Disorder Brother     Suicide Brother     Congenital heart defect Son     Attention Deficit Hyperactivity Disorder Son      Social History     Tobacco Use   Smoking Status Former Smoker    Types: Cigarettes    Start date: 3/17/1968   Larance Afton Quit date: 3/17/1999    Years since quittin.2   Smokeless Tobacco Never Used          Review of Systems, additional:  Constitutional: negative  Eyes: negative  Respiratory: negative  Cardiovascular: negative  Gastrointestinal: negative  Musculoskeletal:negative  Neurological: negative  Behvioral/Psych: negative  Endocrine: negative  ENT: negative    Objective:     Visit Vitals  /82   Pulse 91   Temp 97.3 °F (36.3 °C) (Temporal)   Wt 107.4 kg (236 lb 12.8 oz)   SpO2 96%   BMI 41.95 kg/m²     General:  alert, cooperative, no distress   Chest Wall: inspection normal - no chest wall deformities or tenderness, respiratory effort normal   Lung: clear to auscultation bilaterally   Heart:  normal rate and regular rhythm, S1 and S2 normal, no murmurs noted, no gallops noted   Abdomen: soft, non-tender.  Bowel sounds normal. No masses,  no organomegaly   Extremities: extremities normal, atraumatic, no cyanosis or edema Skin: no rashes   Neuro: alert, oriented, normal speech, no focal findings or movement disorder noted         Assessment/Plan:       ICD-10-CM ICD-9-CM    1. Dyslipidemia, Lipid profile done 06/2018; CHOL 158, HDL 60, LDL 1 2, . Continue lovastatin. Repeat lipid profile. .  Will also order CMP, CBC, T4 and TSH. Return in 1 year E78.5 272.4    2. Family history of premature CAD, no symptoms suggestive of ischemic heart disease at present.    Z82.49 V17.3

## 2022-10-15 RX ORDER — LOVASTATIN 40 MG/1
TABLET ORAL
Qty: 45 TABLET | Refills: 3 | Status: SHIPPED | OUTPATIENT
Start: 2022-10-15

## 2023-09-29 ENCOUNTER — OFFICE VISIT (OUTPATIENT)
Age: 68
End: 2023-09-29
Payer: MEDICARE

## 2023-09-29 VITALS
DIASTOLIC BLOOD PRESSURE: 82 MMHG | BODY MASS INDEX: 42.52 KG/M2 | WEIGHT: 240 LBS | HEIGHT: 63 IN | HEART RATE: 101 BPM | SYSTOLIC BLOOD PRESSURE: 118 MMHG | OXYGEN SATURATION: 90 %

## 2023-09-29 DIAGNOSIS — E78.5 HYPERLIPIDEMIA, UNSPECIFIED HYPERLIPIDEMIA TYPE: ICD-10-CM

## 2023-09-29 DIAGNOSIS — Z82.49 FAMILY HISTORY OF ISCHEMIC HEART DISEASE AND OTHER DISEASES OF THE CIRCULATORY SYSTEM: Primary | ICD-10-CM

## 2023-09-29 PROCEDURE — 93000 ELECTROCARDIOGRAM COMPLETE: CPT | Performed by: INTERNAL MEDICINE

## 2023-09-29 ASSESSMENT — PATIENT HEALTH QUESTIONNAIRE - PHQ9
8. MOVING OR SPEAKING SO SLOWLY THAT OTHER PEOPLE COULD HAVE NOTICED. OR THE OPPOSITE, BEING SO FIGETY OR RESTLESS THAT YOU HAVE BEEN MOVING AROUND A LOT MORE THAN USUAL: 0
SUM OF ALL RESPONSES TO PHQ QUESTIONS 1-9: 0
7. TROUBLE CONCENTRATING ON THINGS, SUCH AS READING THE NEWSPAPER OR WATCHING TELEVISION: 0
2. FEELING DOWN, DEPRESSED OR HOPELESS: 0
SUM OF ALL RESPONSES TO PHQ QUESTIONS 1-9: 0
SUM OF ALL RESPONSES TO PHQ9 QUESTIONS 1 & 2: 0
1. LITTLE INTEREST OR PLEASURE IN DOING THINGS: 0
4. FEELING TIRED OR HAVING LITTLE ENERGY: 0
10. IF YOU CHECKED OFF ANY PROBLEMS, HOW DIFFICULT HAVE THESE PROBLEMS MADE IT FOR YOU TO DO YOUR WORK, TAKE CARE OF THINGS AT HOME, OR GET ALONG WITH OTHER PEOPLE: 0
5. POOR APPETITE OR OVEREATING: 0
SUM OF ALL RESPONSES TO PHQ QUESTIONS 1-9: 0
3. TROUBLE FALLING OR STAYING ASLEEP: 0
9. THOUGHTS THAT YOU WOULD BE BETTER OFF DEAD, OR OF HURTING YOURSELF: 0
6. FEELING BAD ABOUT YOURSELF - OR THAT YOU ARE A FAILURE OR HAVE LET YOURSELF OR YOUR FAMILY DOWN: 0
SUM OF ALL RESPONSES TO PHQ QUESTIONS 1-9: 0

## 2023-10-10 RX ORDER — LOVASTATIN 40 MG/1
TABLET ORAL
Qty: 45 TABLET | Refills: 3 | Status: SHIPPED | OUTPATIENT
Start: 2023-10-10

## 2024-02-09 NOTE — PROGRESS NOTES
This note/encounter/order is being administratively closed in accordance with the defined  policies, procedures and workflows established by [facility]. I cannot validate the clinical  content accuracy of this information.    Raghav Mcpherson MD

## 2024-10-07 NOTE — TELEPHONE ENCOUNTER
Patient called in to request a refill on LOVASTATIN, to be sent to her [facility] on [address]. Patient can be reached at [phone] if needed.

## 2024-10-08 RX ORDER — LOVASTATIN 40 MG
20 TABLET ORAL NIGHTLY
Qty: 45 TABLET | Refills: 3 | Status: SHIPPED | OUTPATIENT
Start: 2024-10-08

## 2024-11-13 ENCOUNTER — OFFICE VISIT (OUTPATIENT)
Age: 69
End: 2024-11-13
Payer: MEDICARE

## 2024-11-13 VITALS
OXYGEN SATURATION: 98 % | HEART RATE: 91 BPM | HEIGHT: 63 IN | SYSTOLIC BLOOD PRESSURE: 139 MMHG | WEIGHT: 247 LBS | BODY MASS INDEX: 43.77 KG/M2 | DIASTOLIC BLOOD PRESSURE: 80 MMHG

## 2024-11-13 DIAGNOSIS — E78.5 HYPERLIPIDEMIA, UNSPECIFIED HYPERLIPIDEMIA TYPE: Primary | ICD-10-CM

## 2024-11-13 DIAGNOSIS — Z82.49 FAMILY HISTORY OF ISCHEMIC HEART DISEASE AND OTHER DISEASES OF THE CIRCULATORY SYSTEM: ICD-10-CM

## 2024-11-13 PROCEDURE — 93000 ELECTROCARDIOGRAM COMPLETE: CPT | Performed by: INTERNAL MEDICINE

## 2024-11-13 NOTE — PROGRESS NOTES
Identified pt with two pt identifiers(name and ). Reviewed record in preparation for visit and have obtained necessary documentation.    Raquel Forde presents today for   Chief Complaint   Patient presents with    Follow-up      - 1 year         Pt denied DIZZINESS, SOB, CHEST PAIN/ PRESSURE, FATIGUE/WEAKNESS, HEADACHES, SWELLING.             Raquel Forde preferred language for health care discussion is english/other.    Personal Protective Equipment:   Personal Protective Equipment was used including: mask-surgical and hands-gloves. Patient was placed on no precaution(s). Patient was not masked.    Precautions:   Patient currently on None  Patient currently roomed with door closed.    Is someone accompanying this pt? no    Is the patient using any DME equipment during OV? no    Depression Screenin/29/2023     8:42 AM 6/3/2022    11:44 AM   PHQ-9 Questionaire   Little interest or pleasure in doing things 0 0   Feeling down, depressed, or hopeless 0 1   Trouble falling or staying asleep, or sleeping too much 0    Feeling tired or having little energy 0    Poor appetite or overeating 0    Feeling bad about yourself - or that you are a failure or have let yourself or your family down 0    Trouble concentrating on things, such as reading the newspaper or watching television 0    Moving or speaking so slowly that other people could have noticed. Or the opposite - being so fidgety or restless that you have been moving around a lot more than usual 0    Thoughts that you would be better off dead, or of hurting yourself in some way 0    PHQ-9 Total Score 0 1   If you checked off any problems, how difficult have these problems made it for you to do your work, take care of things at home, or get along with other people? 0         Learning Assessment:  No question data found.    Abuse Screenin/13/2024    11:00 AM   AMB Abuse Screening   Do you ever feel afraid of your partner? N

## 2025-05-07 ENCOUNTER — OFFICE VISIT (OUTPATIENT)
Age: 70
End: 2025-05-07
Payer: MEDICARE

## 2025-05-07 ENCOUNTER — HOSPITAL ENCOUNTER (OUTPATIENT)
Facility: HOSPITAL | Age: 70
Setting detail: SPECIMEN
Discharge: HOME | End: 2025-05-10
Payer: MEDICARE

## 2025-05-07 VITALS
HEART RATE: 91 BPM | SYSTOLIC BLOOD PRESSURE: 136 MMHG | HEIGHT: 63 IN | DIASTOLIC BLOOD PRESSURE: 85 MMHG | WEIGHT: 231 LBS | OXYGEN SATURATION: 95 % | BODY MASS INDEX: 40.93 KG/M2

## 2025-05-07 DIAGNOSIS — E78.5 DYSLIPIDEMIA: Primary | ICD-10-CM

## 2025-05-07 LAB
CHOLEST SERPL-MCNC: 171 MG/DL
HDLC SERPL-MCNC: 60 MG/DL (ref 40–60)
HDLC SERPL: 2.8 (ref 0–5)
LDLC SERPL CALC-MCNC: 80 MG/DL (ref 0–100)
TRIGL SERPL-MCNC: 151 MG/DL (ref 0–150)
VLDLC SERPL CALC-MCNC: 30 MG/DL

## 2025-05-07 PROCEDURE — 99214 OFFICE O/P EST MOD 30 MIN: CPT | Performed by: INTERNAL MEDICINE

## 2025-05-07 PROCEDURE — G8417 CALC BMI ABV UP PARAM F/U: HCPCS | Performed by: INTERNAL MEDICINE

## 2025-05-07 PROCEDURE — 1126F AMNT PAIN NOTED NONE PRSNT: CPT | Performed by: INTERNAL MEDICINE

## 2025-05-07 PROCEDURE — 3017F COLORECTAL CA SCREEN DOC REV: CPT | Performed by: INTERNAL MEDICINE

## 2025-05-07 PROCEDURE — G8427 DOCREV CUR MEDS BY ELIG CLIN: HCPCS | Performed by: INTERNAL MEDICINE

## 2025-05-07 PROCEDURE — 1123F ACP DISCUSS/DSCN MKR DOCD: CPT | Performed by: INTERNAL MEDICINE

## 2025-05-07 PROCEDURE — 1090F PRES/ABSN URINE INCON ASSESS: CPT | Performed by: INTERNAL MEDICINE

## 2025-05-07 PROCEDURE — G8399 PT W/DXA RESULTS DOCUMENT: HCPCS | Performed by: INTERNAL MEDICINE

## 2025-05-07 PROCEDURE — 80061 LIPID PANEL: CPT

## 2025-05-07 PROCEDURE — 36415 COLL VENOUS BLD VENIPUNCTURE: CPT

## 2025-05-07 PROCEDURE — 1159F MED LIST DOCD IN RCRD: CPT | Performed by: INTERNAL MEDICINE

## 2025-05-07 PROCEDURE — 1036F TOBACCO NON-USER: CPT | Performed by: INTERNAL MEDICINE

## 2025-05-07 ASSESSMENT — PATIENT HEALTH QUESTIONNAIRE - PHQ9
1. LITTLE INTEREST OR PLEASURE IN DOING THINGS: NOT AT ALL
SUM OF ALL RESPONSES TO PHQ QUESTIONS 1-9: 0
SUM OF ALL RESPONSES TO PHQ QUESTIONS 1-9: 0
2. FEELING DOWN, DEPRESSED OR HOPELESS: NOT AT ALL
SUM OF ALL RESPONSES TO PHQ QUESTIONS 1-9: 0
SUM OF ALL RESPONSES TO PHQ QUESTIONS 1-9: 0

## 2025-05-07 NOTE — PROGRESS NOTES
Identified pt with two pt identifiers(name and ). Reviewed record in preparation for visit and have obtained necessary documentation.    Raquel Forde presents today for   Chief Complaint   Patient presents with    Follow-up      1 year- please review appt. Pt last seen 2023         Pt c/o DIZZINESS, SOB, CHEST PAIN/ PRESSURE, FATIGUE/WEAKNESS, HEADACHES, SWELLING.             Rqauel Forde preferred language for health care discussion is english/other.    Personal Protective Equipment:   Personal Protective Equipment was used including: mask-surgical and hands-gloves. Patient was placed on no precaution(s). Patient was NOT masked.    Precautions:   Patient currently on None  Patient currently roomed with door closed.    Is someone accompanying this pt? NO    Is the patient using any DME equipment during OV? NO    Depression Screenin/7/2025     2:04 PM 2023     8:42 AM 6/3/2022    11:44 AM   PHQ-9 Questionaire   Little interest or pleasure in doing things 0 0 0   Feeling down, depressed, or hopeless 0 0 1   Trouble falling or staying asleep, or sleeping too much  0    Feeling tired or having little energy  0    Poor appetite or overeating  0    Feeling bad about yourself - or that you are a failure or have let yourself or your family down  0    Trouble concentrating on things, such as reading the newspaper or watching television  0    Moving or speaking so slowly that other people could have noticed. Or the opposite - being so fidgety or restless that you have been moving around a lot more than usual  0    Thoughts that you would be better off dead, or of hurting yourself in some way  0    PHQ-9 Total Score 0 0 1   If you checked off any problems, how difficult have these problems made it for you to do your work, take care of things at home, or get along with other people?  0         Learning Assessment:  Who is the primary learner? Patient    What is the preferred language

## 2025-05-12 NOTE — PROGRESS NOTES
[facility] Follow-Up Note    Assessment/Plan:     Assessment & Plan  1. Cholesterol management.  Her EKG results from 11/2024 are within normal limits. A cholesterol panel will be ordered to monitor her statin therapy.  Lipid profile 5/7/2025.  Total cholesterol 171.  Triglycerides 151.  HDL 60.  LDL 80.  Presently patient is taking lovastatin 20 mg daily.    3. Skin tag.  Advised to consult with a general practitioner at [facility] for the removal of the skin tag. Informed that our facility does not have the necessary equipment or local anesthesia for the procedure.    Follow-up  Follow up in 1 year.     Diagnosis Orders   1. Dyslipidemia  Lipid Panel    Lipid Panel        History of Present Illness  The patient is a 70-year-old woman who presents for evaluation of her cholesterol management and to discuss a skin tag.    She reports no current complaints, including chest pain or respiratory distress. She is interested in having her blood work done to monitor her cholesterol levels. There is no edema in her ankles. Recently, she underwent a health checkup with [facility], which yielded satisfactory results.    A skin tag has been identified, causing discomfort due to its location in contact with her clothing. She plans to seek the services of a general practitioner at [facility] for its removal.    She had a severe case of bronchitis before her recent trip.    SOCIAL HISTORY  Marital Status:   Occupations: Worked at the [employer]     Current Outpatient Medications   Medication Sig Dispense Refill    lovastatin (MEVACOR) 40 MG tablet Take 0.5 tablets by mouth nightly 45 tablet 3    aspirin 81 MG EC tablet Take 1 tablet by mouth daily      coenzyme Q10 100 MG CAPS capsule Take 1 capsule by mouth daily      acetaminophen (TYLENOL) 500 MG tablet Take 2 tablets by mouth every 6 hours as needed for Pain or Fever      Calcium Carb-Cholecalciferol 600-20 MG-MCG CHEW Take 1 tablet by mouth